# Patient Record
Sex: MALE | Race: BLACK OR AFRICAN AMERICAN | Employment: UNEMPLOYED | URBAN - METROPOLITAN AREA
[De-identification: names, ages, dates, MRNs, and addresses within clinical notes are randomized per-mention and may not be internally consistent; named-entity substitution may affect disease eponyms.]

---

## 2018-06-16 ENCOUNTER — HOSPITAL ENCOUNTER (EMERGENCY)
Age: 28
Discharge: HOME | End: 2018-06-16
Attending: EMERGENCY MEDICINE
Payer: MEDICAID

## 2018-06-16 ENCOUNTER — APPOINTMENT (OUTPATIENT)
Dept: GENERAL RADIOLOGY | Age: 28
End: 2018-06-16
Attending: PHYSICIAN ASSISTANT
Payer: MEDICAID

## 2018-06-16 VITALS
RESPIRATION RATE: 14 BRPM | OXYGEN SATURATION: 97 % | DIASTOLIC BLOOD PRESSURE: 83 MMHG | TEMPERATURE: 98.5 F | BODY MASS INDEX: 27.06 KG/M2 | SYSTOLIC BLOOD PRESSURE: 133 MMHG | HEART RATE: 73 BPM | WEIGHT: 194 LBS

## 2018-06-16 DIAGNOSIS — S16.1XXA STRAIN OF NECK MUSCLE, INITIAL ENCOUNTER: ICD-10-CM
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
## 2018-06-16 PROCEDURE — 99282 EMERGENCY DEPT VISIT SF MDM: CPT

## 2018-06-16 PROCEDURE — 72050 X-RAY EXAM NECK SPINE 4/5VWS: CPT

## 2018-06-16 RX ORDER — OXYCODONE AND ACETAMINOPHEN 5; 325 MG/1; MG/1
1 TABLET ORAL
Status: DISCONTINUED | OUTPATIENT
Start: 2018-06-16 | End: 2018-06-16 | Stop reason: CLARIF
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
## 2018-07-14 ENCOUNTER — HOSPITAL ENCOUNTER (EMERGENCY)
Age: 28
Discharge: HOME | End: 2018-07-14
Attending: EMERGENCY MEDICINE
Payer: SELF-PAY

## 2018-07-14 VITALS
DIASTOLIC BLOOD PRESSURE: 86 MMHG | RESPIRATION RATE: 18 BRPM | SYSTOLIC BLOOD PRESSURE: 136 MMHG | HEART RATE: 92 BPM | TEMPERATURE: 98.2 F | OXYGEN SATURATION: 94 %

## 2018-07-14 DIAGNOSIS — R46.89 AGGRESSIVE BEHAVIOR: Primary | ICD-10-CM

## 2018-07-14 LAB
AMPHET UR QL SCN: NEGATIVE
ANION GAP SERPL CALC-SCNC: 7 MMOL/L (ref 3–18)
BARBITURATES UR QL SCN: NEGATIVE
BASOPHILS # BLD: 0 K/UL (ref 0–0.1)
BASOPHILS NFR BLD: 0 % (ref 0–2)
BENZODIAZ UR QL: NEGATIVE
BUN SERPL-MCNC: 11 MG/DL (ref 7–18)
BUN/CREAT SERPL: 10 (ref 12–20)
CALCIUM SERPL-MCNC: 8.6 MG/DL (ref 8.5–10.1)
CANNABINOIDS UR QL SCN: POSITIVE
CHLORIDE SERPL-SCNC: 106 MMOL/L (ref 100–108)
CO2 SERPL-SCNC: 28 MMOL/L (ref 21–32)
COCAINE UR QL SCN: NEGATIVE
CREAT SERPL-MCNC: 1.14 MG/DL (ref 0.6–1.3)
DIFFERENTIAL METHOD BLD: ABNORMAL
EOSINOPHIL # BLD: 0.1 K/UL (ref 0–0.4)
EOSINOPHIL NFR BLD: 1 % (ref 0–5)
ERYTHROCYTE [DISTWIDTH] IN BLOOD BY AUTOMATED COUNT: 13.2 % (ref 11.6–14.5)
ETHANOL SERPL-MCNC: <3 MG/DL (ref 0–3)
GLUCOSE SERPL-MCNC: 93 MG/DL (ref 74–99)
HCT VFR BLD AUTO: 41.3 % (ref 36–48)
HDSCOM,HDSCOM: ABNORMAL
HGB BLD-MCNC: 14.3 G/DL (ref 13–16)
LYMPHOCYTES # BLD: 2.8 K/UL (ref 0.9–3.6)
LYMPHOCYTES NFR BLD: 30 % (ref 21–52)
MCH RBC QN AUTO: 28.1 PG (ref 24–34)
MCHC RBC AUTO-ENTMCNC: 34.6 G/DL (ref 31–37)
MCV RBC AUTO: 81.3 FL (ref 74–97)
METHADONE UR QL: NEGATIVE
MONOCYTES # BLD: 1.2 K/UL (ref 0.05–1.2)
MONOCYTES NFR BLD: 12 % (ref 3–10)
NEUTS SEG # BLD: 5.4 K/UL (ref 1.8–8)
NEUTS SEG NFR BLD: 57 % (ref 40–73)
OPIATES UR QL: NEGATIVE
PCP UR QL: NEGATIVE
PLATELET # BLD AUTO: 159 K/UL (ref 135–420)
PMV BLD AUTO: 11.5 FL (ref 9.2–11.8)
POTASSIUM SERPL-SCNC: 3.9 MMOL/L (ref 3.5–5.5)
RBC # BLD AUTO: 5.08 M/UL (ref 4.7–5.5)
SODIUM SERPL-SCNC: 141 MMOL/L (ref 136–145)
WBC # BLD AUTO: 9.5 K/UL (ref 4.6–13.2)

## 2018-07-14 PROCEDURE — 80307 DRUG TEST PRSMV CHEM ANLYZR: CPT | Performed by: EMERGENCY MEDICINE

## 2018-07-14 PROCEDURE — 99283 EMERGENCY DEPT VISIT LOW MDM: CPT

## 2018-07-14 PROCEDURE — 85025 COMPLETE CBC W/AUTO DIFF WBC: CPT | Performed by: EMERGENCY MEDICINE

## 2018-07-14 PROCEDURE — 80048 BASIC METABOLIC PNL TOTAL CA: CPT | Performed by: EMERGENCY MEDICINE
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
## 2019-04-21 PROCEDURE — 99282 EMERGENCY DEPT VISIT SF MDM: CPT

## 2019-04-22 ENCOUNTER — HOSPITAL ENCOUNTER (EMERGENCY)
Age: 29
Discharge: HOME | End: 2019-04-22
Attending: EMERGENCY MEDICINE
Payer: MEDICAID

## 2019-04-22 VITALS
OXYGEN SATURATION: 99 % | RESPIRATION RATE: 16 BRPM | SYSTOLIC BLOOD PRESSURE: 112 MMHG | DIASTOLIC BLOOD PRESSURE: 82 MMHG | HEART RATE: 70 BPM | TEMPERATURE: 97 F

## 2019-04-22 DIAGNOSIS — R44.3 HALLUCINATION: ICD-10-CM

## 2019-04-22 DIAGNOSIS — F41.9 ANXIETY: Primary | ICD-10-CM

## 2019-04-22 NOTE — ED TRIAGE NOTES
When I informed pt about the lack of beds he states he wants to be psych. States he is hearing voices that are telling him he is worthless but will not act on them. Getting pt some water so he can provide a urine sample.

## 2019-04-22 NOTE — ED PROVIDER NOTES
EMERGENCY DEPARTMENT HISTORY AND PHYSICAL EXAM 
 
5:20 AM 
 
 
Date: 4/22/2019 Patient Name: Umer Cardenas History of Presenting Illness Chief Complaint Patient presents with  Anxiety  Mental Health Problem History Provided By: Patient Additional History (Context): Umer Cardenas is a 33 y.o. male with hx of schizophrenia, aggressive outburst, monica, and substance abuse who presents with acute anxiety attack over the last 8 hours. He reports he has had similar attacks in the past. Pt states that he is hearing voices, but he states he normally hears voices. Pt denies SI or HI. He states he sees a counselor and is taking his medications. Thinks he may be on too many. No other concerns or symptoms at this time. PCP: Kb Noel MD 
 
Chief Complaint: Anxiety Duration: 8 Hours Timing:  Acute Location: N/A Quality: N/A Severity: N/A Modifying Factors: None Associated Symptoms: hallucinations Current Outpatient Medications Medication Sig Dispense Refill  divalproex sodium (DEPAKOTE PO) Take  by mouth.  haloperidol lactate (HALDOL INJECTION) by Injection route.  cholecalciferol (VITAMIN D3) 1,000 unit tablet Take 1,000 Units by mouth daily. Past History Past Medical History: 
Past Medical History:  
Diagnosis Date  Aggressive outburst   
 Monica ([facility]  Schizophrenia ([facility] [facility]  Substance abuse ([facility] [address] Past Surgical History: 
History reviewed. No pertinent surgical history. Family History: 
Family History Problem Relation Age of Onset  Bipolar Disorder Maternal Aunt Social History: 
Social History Tobacco Use  Smoking status: Current Every Day Smoker Packs/day: 1.00 Types: Cigarettes Substance Use Topics  Alcohol use: No  
  Comment: none currently  Drug use: No  
  Comment: noone currently Allergies: 
No Known Allergies Review of Systems Review of Systems Constitutional: Negative for activity change, fatigue and fever. HENT: Negative for congestion and rhinorrhea. Eyes: Negative for visual disturbance. Respiratory: Negative for shortness of breath. Cardiovascular: Negative for chest pain and palpitations. Gastrointestinal: Negative for abdominal pain, diarrhea, nausea and vomiting. Genitourinary: Negative for dysuria and hematuria. Musculoskeletal: Negative for back pain. Skin: Negative for rash. Neurological: Negative for dizziness, weakness and light-headedness. Psychiatric/Behavioral: Positive for hallucinations. Negative for self-injury and suicidal ideas. The patient is nervous/anxious. Negative for homicidal ideas. All other systems reviewed and are negative. Physical Exam  
 
Visit Vitals /82 (BP 1 Location: Left arm, BP Patient Position: At rest;Sitting) Pulse 70 Temp 97 °F (36.1 °C) Resp 16 SpO2 99% Physical Exam  
Constitutional: He is oriented to person, place, and time. He appears well-developed and well-nourished. No distress. Disheveled HENT:  
Head: Normocephalic and atraumatic. Right Ear: External ear normal.  
Left Ear: External ear normal.  
Nose: Nose normal.  
Mouth/Throat: Oropharynx is clear and moist.  
Eyes: Pupils are equal, round, and reactive to light. Conjunctivae and EOM are normal.  
Neck: Normal range of motion. Neck supple. No tracheal deviation present. Cardiovascular: Normal rate, regular rhythm and intact distal pulses. Pulmonary/Chest: Effort normal and breath sounds normal. He exhibits no tenderness. Abdominal: Soft. Bowel sounds are normal. He exhibits no distension. There is no tenderness. There is no rebound and no guarding. Musculoskeletal: Normal range of motion. He exhibits no edema or tenderness. Neurological: He is alert and oriented to person, place, and time. No cranial nerve deficit. Coordination normal.  
Skin: Skin is warm and dry. Psychiatric: His behavior is normal.  
Nursing note and vitals reviewed. Diagnostic Study Results Labs - No results found for this or any previous visit (from the past 12 hour(s)). Radiologic Studies - No orders to display Medical Decision Making It should be noted that Daquan LERNER DO will be the provider of record for this patient. I reviewed the vital signs, available nursing notes, past medical history, past surgical history, family history and social history. Vital Signs-Reviewed the patient's vital signs. Pulse Oximetry Analysis -  99% on room air , nml 
 
Cardiac Monitor: 
Rate: 70 BPM 
 
 
Records Reviewed: Nursing Notes and Old Medical Records (Time of Review: 5:20 AM) ED Course: Progress Notes, Reevaluation, and Consults: 
Patient with no SI or HI Provider Notes (Medical Decision Making):  
Patient is a 65-year-old male who states that he was anxious at his place of living. Patient has hallucinations but this is not new for him. He is not suicidal or homicidal.  Voices are not telling him to hurt himself or anyone else. Patient is hungry and would like food. States that he has a counselor that he sees a [place] that he can follow-up with today. He is concerned that he may be on too many of his medications. He is to discuss this with his counselor as an outpatient appointment. Understands and agrees with plan. He is not a danger to himself and others at this time and has chronic hallucinations. Stable for discharge. Diagnosis Clinical Impression: 1. Anxiety 2. Hallucination Disposition: Discharged Follow-up Information Follow up With Specialties Details Why Contact Info Victor Manuel Francis MD Internal Medicine  Please call your counselor today for an appointment [address] [place] [address] [facility] [address] [phone] Patient's Medications Start Taking No medications on file Continue Taking CHOLECALCIFEROL (VITAMIN D3) 1,000 UNIT TABLET    Take 1,000 Units by mouth daily. DIVALPROEX SODIUM (DEPAKOTE PO)    Take  by mouth. HALOPERIDOL LACTATE (HALDOL INJECTION)    by Injection route. These Medications have changed No medications on file Stop Taking No medications on file  
 
_______________________________ Scribe Attestation Anastacia Abreu acting as a scribe for and in the presence of [address] [place], [place] [address]e [facility], DO April 22, 2019 at 5:52 AM 
    
Provider Attestation:     
I personally performed the services described in the documentation, reviewed the documentation, as recorded by the scribe in my presence, and it accurately and completely records my words and actions. April 22, 2019 at 5:52 AM - Lyndal Runner A, DO  
 
 
_______________________________

## 2019-04-22 NOTE — ED TRIAGE NOTES
Pt c/o anxiety since 2030 tonight. Pt dx with Bipolar and anxiety, states his meds are not working right now. Pt eyes are blood shot and are bouncing when he looks to the left. Pt falling asleep in triage, had to be woken up to be called into triage on second attempt.

## 2019-05-08 ENCOUNTER — HOSPITAL ENCOUNTER (EMERGENCY)
Age: 29
Discharge: HOME | End: 2019-05-08
Attending: EMERGENCY MEDICINE
Payer: COMMERCIAL

## 2019-05-08 VITALS
HEART RATE: 79 BPM | HEIGHT: 72 IN | BODY MASS INDEX: 24.38 KG/M2 | RESPIRATION RATE: 16 BRPM | WEIGHT: 180 LBS | TEMPERATURE: 98.2 F | DIASTOLIC BLOOD PRESSURE: 73 MMHG | OXYGEN SATURATION: 97 % | SYSTOLIC BLOOD PRESSURE: 116 MMHG

## 2019-05-08 DIAGNOSIS — F20.9 SCHIZOPHRENIA, UNSPECIFIED TYPE (HCC): Primary | ICD-10-CM

## 2019-05-08 LAB
ALBUMIN SERPL-MCNC: 3.5 G/DL (ref 3.4–5)
ALBUMIN/GLOB SERPL: 1.2 {RATIO} (ref 0.8–1.7)
ALP SERPL-CCNC: 54 U/L (ref 45–117)
ALT SERPL-CCNC: 15 U/L (ref 16–61)
ANION GAP SERPL CALC-SCNC: 7 MMOL/L (ref 3–18)
AST SERPL-CCNC: 13 U/L (ref 15–37)
BASOPHILS # BLD: 0 K/UL (ref 0–0.1)
BASOPHILS NFR BLD: 0 % (ref 0–2)
BILIRUB SERPL-MCNC: 0.3 MG/DL (ref 0.2–1)
BUN SERPL-MCNC: 7 MG/DL (ref 7–18)
BUN/CREAT SERPL: 6 (ref 12–20)
CALCIUM SERPL-MCNC: 8.6 MG/DL (ref 8.5–10.1)
CHLORIDE SERPL-SCNC: 105 MMOL/L (ref 100–108)
CO2 SERPL-SCNC: 27 MMOL/L (ref 21–32)
CREAT SERPL-MCNC: 1.14 MG/DL (ref 0.6–1.3)
DIFFERENTIAL METHOD BLD: NORMAL
EOSINOPHIL # BLD: 0.1 K/UL (ref 0–0.4)
EOSINOPHIL NFR BLD: 2 % (ref 0–5)
ERYTHROCYTE [DISTWIDTH] IN BLOOD BY AUTOMATED COUNT: 13.8 % (ref 11.6–14.5)
ETHANOL SERPL-MCNC: <3 MG/DL (ref 0–3)
GLOBULIN SER CALC-MCNC: 3 G/DL (ref 2–4)
GLUCOSE SERPL-MCNC: 75 MG/DL (ref 74–99)
HCT VFR BLD AUTO: 39.9 % (ref 36–48)
HGB BLD-MCNC: 14.1 G/DL (ref 13–16)
LYMPHOCYTES # BLD: 3.6 K/UL (ref 0.9–3.6)
LYMPHOCYTES NFR BLD: 46 % (ref 21–52)
MCH RBC QN AUTO: 29.7 PG (ref 24–34)
MCHC RBC AUTO-ENTMCNC: 35.3 G/DL (ref 31–37)
MCV RBC AUTO: 84.2 FL (ref 74–97)
MONOCYTES # BLD: 0.7 K/UL (ref 0.05–1.2)
MONOCYTES NFR BLD: 8 % (ref 3–10)
NEUTS SEG # BLD: 3.4 K/UL (ref 1.8–8)
NEUTS SEG NFR BLD: 44 % (ref 40–73)
PLATELET # BLD AUTO: 167 K/UL (ref 135–420)
PMV BLD AUTO: 11.6 FL (ref 9.2–11.8)
POTASSIUM SERPL-SCNC: 3.7 MMOL/L (ref 3.5–5.5)
PROT SERPL-MCNC: 6.5 G/DL (ref 6.4–8.2)
RBC # BLD AUTO: 4.74 M/UL (ref 4.7–5.5)
SODIUM SERPL-SCNC: 139 MMOL/L (ref 136–145)
WBC # BLD AUTO: 7.8 K/UL (ref 4.6–13.2)

## 2019-05-08 PROCEDURE — 80053 COMPREHEN METABOLIC PANEL: CPT

## 2019-05-08 PROCEDURE — 99283 EMERGENCY DEPT VISIT LOW MDM: CPT

## 2019-05-08 PROCEDURE — 85025 COMPLETE CBC W/AUTO DIFF WBC: CPT

## 2019-05-08 PROCEDURE — 80307 DRUG TEST PRSMV CHEM ANLYZR: CPT

## 2019-05-09 NOTE — DISCHARGE INSTRUCTIONS
Patient Education        Schizophrenia: Care Instructions  Your Care Instructions  Schizophrenia is a disease that makes it hard to think clearly, manage emotions, and interact with other people. It can cause:  · Delusions. These are beliefs that are not real.  · Hallucinations. These are things that you may see or hear that are not really there. · Paranoia. This is the belief that others are lying, cheating, using you, or trying to harm you. The disease may change your ability to enjoy life, express emotions, or function. At times, you may hear voices, behave strangely, have trouble speaking or understanding speech, or keep to yourself. The goal of treatment is to lower your stress and help your brain function normally. You may need lifelong treatment with medicines and counseling to keep your schizophrenia under control. When schizophrenia is not treated, the risks are higher for suicide, a hospital stay, and other problems. Early treatment called coordinated specialty care [facility]) may help a person who is having his or her first episode of psychotic thoughts. Ask your doctor about [address]. Follow-up care is a key part of your treatment and safety. Be sure to make and go to all appointments, and call your doctor if you are having problems. It's also a good idea to know your test results and keep a list of the medicines you take. How can you care for yourself at home? · Be safe with medicines. Take your medicines exactly as prescribed. Call your doctor if you think you are having a problem with your medicine. When you feel good, you may think that you do not need your medicines. But it is important to keep taking them as scheduled. · Go to your counseling sessions. Call and talk with your counselor if you can't attend or if you don't think the sessions are helping. Do not just stop going. · Eat a healthy diet. Talk with a dietitian about what type of diet may be best for you.   · Do not use alcohol or illegal drugs.  · Keep the numbers for these national suicide hotlines: 1-405-205-TALK (1-944.227.3585) and 0-438-GYYEVDL (2-628.621.5192). If you or someone you know talks about suicide or feeling hopeless, get help right away. What should you do if someone in your family has schizophrenia? · Learn about the disease and how it may get worse over time. · Remind your family member that you love him or her. · Make a plan with all family members about how to take care of your loved one when his or her symptoms are bad. · Talk about your fears and concerns and those of other family members. Seek counseling if needed. · Do not focus attention only on the person who is in treatment. · Remind yourself that it will take time for changes to occur. · Do not blame yourself for the disease. · Know your legal rights and the legal rights of your family member. · Take care of yourself. Stay involved with your own interests, such as your career, hobbies, and friends. Use exercise, positive self-talk, relaxation, and deep breathing to help lower your stress. · Give yourself time to grieve. You may need to deal with emotions such as anger, fear, and frustration. After you work through your feelings, you will be better able to care for yourself and your family. · If you are having a hard time with your feelings and your interactions with your family member, talk with a counselor. When should you call for help? Call 911 anytime you think you may need emergency care.  For example, call if:    · You are thinking about suicide or are threatening suicide.     · You feel like hurting yourself or someone else.    Call your doctor now or seek immediate medical care if:    · You hear voices.     · You think someone is trying to harm you.     · You cannot concentrate or are easily confused.    Watch closely for changes in your health, and be sure to contact your doctor if:    · You are having trouble taking care of yourself.     · You cannot attend your counseling sessions. Where can you learn more? Go to http://tara-kayleen.info/. Enter R727 in the search box to learn more about \"Schizophrenia: Care Instructions. \"  Current as of: September 11, 2018  Content Version: 11.9  © 7152-0870 [employer]. Care instructions adapted under license by [employer] (which disclaims liability or warranty for this information). If you have questions about a medical condition or this instruction, always ask your healthcare professional. [address] any warranty or liability for your use of this information.
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
## 2019-05-09 NOTE — ED NOTES
I have reviewed discharge instructions with the patient. The patient verbalized understanding. Patient ambulated out of ER without distress. Steady gait noted. Vitals signs stable upon discharge. No new complaints noted at this time.

## 2019-05-09 NOTE — ED TRIAGE NOTES
Patient brought in by EMS. He stated that he is having hallucinations and feels their are ticks crawling on him. Patient offered water to give urine sample.

## 2019-06-16 ENCOUNTER — HOSPITAL ENCOUNTER (EMERGENCY)
Age: 29
Discharge: LEFT BEFORE DISCHARGE | End: 2019-06-17
Attending: EMERGENCY MEDICINE
Payer: COMMERCIAL

## 2019-06-16 VITALS
TEMPERATURE: 98.7 F | HEIGHT: 72 IN | BODY MASS INDEX: 24.38 KG/M2 | SYSTOLIC BLOOD PRESSURE: 134 MMHG | OXYGEN SATURATION: 99 % | RESPIRATION RATE: 16 BRPM | WEIGHT: 180 LBS | HEART RATE: 72 BPM | DIASTOLIC BLOOD PRESSURE: 89 MMHG

## 2019-06-16 PROCEDURE — 75810000275 HC EMERGENCY DEPT VISIT NO LEVEL OF CARE

## 2020-01-03 ENCOUNTER — HOSPITAL ENCOUNTER (EMERGENCY)
Age: 30
Discharge: HOME | End: 2020-01-04
Attending: EMERGENCY MEDICINE
Payer: COMMERCIAL

## 2020-01-03 VITALS
DIASTOLIC BLOOD PRESSURE: 75 MMHG | HEART RATE: 86 BPM | BODY MASS INDEX: 25.26 KG/M2 | OXYGEN SATURATION: 99 % | WEIGHT: 186.5 LBS | HEIGHT: 72 IN | SYSTOLIC BLOOD PRESSURE: 121 MMHG | TEMPERATURE: 98.5 F | RESPIRATION RATE: 16 BRPM

## 2020-01-03 DIAGNOSIS — F25.9 SCHIZOAFFECTIVE DISORDER, UNSPECIFIED TYPE (HCC): Primary | ICD-10-CM

## 2020-01-03 PROCEDURE — 99282 EMERGENCY DEPT VISIT SF MDM: CPT

## 2020-01-04 NOTE — DISCHARGE INSTRUCTIONS
Patient Education        Schizophrenia: Care Instructions  Your Care Instructions  Schizophrenia is a disease that makes it hard to think clearly, manage emotions, and interact with other people. It can cause:  · Delusions. These are beliefs that are not real.  · Hallucinations. These are things that you may see or hear that are not really there. · Paranoia. This is the belief that others are lying, cheating, using you, or trying to harm you. The disease may change your ability to enjoy life, express emotions, or function. At times, you may hear voices, behave strangely, have trouble speaking or understanding speech, or keep to yourself. The goal of treatment is to lower your stress and help your brain function normally. You may need lifelong treatment with medicines and counseling to keep your schizophrenia under control. When schizophrenia is not treated, the risks are higher for suicide, a hospital stay, and other problems. Early treatment called coordinated specialty care [facility]) may help a person who is having his or her first episode of psychotic thoughts. Ask your doctor about [facility]. Follow-up care is a key part of your treatment and safety. Be sure to make and go to all appointments, and call your doctor if you are having problems. It's also a good idea to know your test results and keep a list of the medicines you take. How can you care for yourself at home? · Be safe with medicines. Take your medicines exactly as prescribed. Call your doctor if you think you are having a problem with your medicine. When you feel good, you may think that you do not need your medicines. But it is important to keep taking them as scheduled. · Go to your counseling sessions. Call and talk with your counselor if you can't attend or if you don't think the sessions are helping. Do not just stop going. · Eat a healthy diet. Talk with a dietitian about what type of diet may be best for you.   · Do not use alcohol or illegal drugs.  · Keep the numbers for these national suicide hotlines: 7-153-902-TALK (8-790.268.1379) and 9-486-OTQXKDH (9-803.261.4355). If you or someone you know talks about suicide or feeling hopeless, get help right away. What should you do if someone in your family has schizophrenia? · Learn about the disease and how it may get worse over time. · Remind your family member that you love him or her. · Make a plan with all family members about how to take care of your loved one when his or her symptoms are bad. · Talk about your fears and concerns and those of other family members. Seek counseling if needed. · Do not focus attention only on the person who is in treatment. · Remind yourself that it will take time for changes to occur. · Do not blame yourself for the disease. · Know your legal rights and the legal rights of your family member. · Take care of yourself. Stay involved with your own interests, such as your career, hobbies, and friends. Use exercise, positive self-talk, relaxation, and deep breathing to help lower your stress. · Give yourself time to grieve. You may need to deal with emotions such as anger, fear, and frustration. After you work through your feelings, you will be better able to care for yourself and your family. · If you are having a hard time with your feelings and your interactions with your family member, talk with a counselor. When should you call for help? Call 911 anytime you think you may need emergency care.  For example, call if:    · You are thinking about suicide or are threatening suicide.     · You feel like hurting yourself or someone else.    Call your doctor now or seek immediate medical care if:    · You hear voices.     · You think someone is trying to harm you.     · You cannot concentrate or are easily confused.    Watch closely for changes in your health, and be sure to contact your doctor if:    · You are having trouble taking care of yourself.     · You cannot attend your counseling sessions. Where can you learn more? Go to http://tara-kayleen.info/. Enter M593 in the search box to learn more about \"Schizophrenia: Care Instructions. \"  Current as of: May 28, 2019  Content Version: 12.2  © 6741-3487 [employer]. Care instructions adapted under license by [employer] (which disclaims liability or warranty for this information). If you have questions about a medical condition or this instruction, always ask your healthcare professional. [address] any warranty or liability for your use of this information.
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
## 2020-01-25 ENCOUNTER — HOSPITAL ENCOUNTER (INPATIENT)
Age: 30
LOS: 3 days | Discharge: HOME | DRG: 750 | End: 2020-01-29
Attending: EMERGENCY MEDICINE | Admitting: PSYCHIATRY & NEUROLOGY
Payer: COMMERCIAL

## 2020-01-25 DIAGNOSIS — R44.0 AUDITORY HALLUCINATIONS: Primary | ICD-10-CM

## 2020-01-25 LAB
AMPHET UR QL SCN: NEGATIVE
BARBITURATES UR QL SCN: NEGATIVE
BENZODIAZ UR QL: NEGATIVE
CANNABINOIDS UR QL SCN: NEGATIVE
COCAINE UR QL SCN: NEGATIVE
HDSCOM,HDSCOM: NORMAL
METHADONE UR QL: NEGATIVE
OPIATES UR QL: NEGATIVE
PCP UR QL: NEGATIVE

## 2020-01-25 PROCEDURE — 80307 DRUG TEST PRSMV CHEM ANLYZR: CPT

## 2020-01-25 PROCEDURE — 99282 EMERGENCY DEPT VISIT SF MDM: CPT

## 2020-01-26 PROBLEM — F20.9 SCHIZOPHRENIA (HCC): Status: ACTIVE | Noted: 2020-01-26

## 2020-01-26 LAB
ANION GAP SERPL CALC-SCNC: 4 MMOL/L (ref 3–18)
BASOPHILS # BLD: 0 K/UL (ref 0–0.1)
BASOPHILS NFR BLD: 0 % (ref 0–2)
BUN SERPL-MCNC: 15 MG/DL (ref 7–18)
BUN/CREAT SERPL: 12 (ref 12–20)
CALCIUM SERPL-MCNC: 8.5 MG/DL (ref 8.5–10.1)
CHLORIDE SERPL-SCNC: 105 MMOL/L (ref 100–111)
CO2 SERPL-SCNC: 30 MMOL/L (ref 21–32)
CREAT SERPL-MCNC: 1.24 MG/DL (ref 0.6–1.3)
DIFFERENTIAL METHOD BLD: ABNORMAL
EOSINOPHIL # BLD: 0.2 K/UL (ref 0–0.4)
EOSINOPHIL NFR BLD: 2 % (ref 0–5)
ERYTHROCYTE [DISTWIDTH] IN BLOOD BY AUTOMATED COUNT: 13.8 % (ref 11.6–14.5)
ETHANOL SERPL-MCNC: <3 MG/DL (ref 0–3)
GLUCOSE SERPL-MCNC: 84 MG/DL (ref 74–99)
HCT VFR BLD AUTO: 41.8 % (ref 36–48)
HGB BLD-MCNC: 14.1 G/DL (ref 13–16)
LYMPHOCYTES # BLD: 3.2 K/UL (ref 0.9–3.6)
LYMPHOCYTES NFR BLD: 43 % (ref 21–52)
MCH RBC QN AUTO: 29.1 PG (ref 24–34)
MCHC RBC AUTO-ENTMCNC: 33.7 G/DL (ref 31–37)
MCV RBC AUTO: 86.2 FL (ref 74–97)
MONOCYTES # BLD: 1.1 K/UL (ref 0.05–1.2)
MONOCYTES NFR BLD: 14 % (ref 3–10)
NEUTS SEG # BLD: 3.2 K/UL (ref 1.8–8)
NEUTS SEG NFR BLD: 41 % (ref 40–73)
PLATELET # BLD AUTO: 161 K/UL (ref 135–420)
PMV BLD AUTO: 12.8 FL (ref 9.2–11.8)
POTASSIUM SERPL-SCNC: 4.3 MMOL/L (ref 3.5–5.5)
RBC # BLD AUTO: 4.85 M/UL (ref 4.7–5.5)
SODIUM SERPL-SCNC: 139 MMOL/L (ref 136–145)
WBC # BLD AUTO: 7.7 K/UL (ref 4.6–13.2)

## 2020-01-26 PROCEDURE — 65220000003 HC RM SEMIPRIVATE PSYCH

## 2020-01-26 PROCEDURE — 80307 DRUG TEST PRSMV CHEM ANLYZR: CPT

## 2020-01-26 PROCEDURE — 80048 BASIC METABOLIC PNL TOTAL CA: CPT

## 2020-01-26 PROCEDURE — 85025 COMPLETE CBC W/AUTO DIFF WBC: CPT

## 2020-01-26 RX ORDER — TRAZODONE HYDROCHLORIDE 50 MG/1
50 TABLET ORAL
Status: DISCONTINUED | OUTPATIENT
Start: 2020-01-26 | End: 2020-01-29 | Stop reason: HOSPADM

## 2020-01-26 RX ORDER — LORAZEPAM 1 MG/1
1-2 TABLET ORAL
Status: DISCONTINUED | OUTPATIENT
Start: 2020-01-26 | End: 2020-01-29 | Stop reason: HOSPADM

## 2020-01-26 RX ORDER — HALOPERIDOL 5 MG/1
5 TABLET ORAL
Status: DISCONTINUED | OUTPATIENT
Start: 2020-01-26 | End: 2020-01-29 | Stop reason: HOSPADM

## 2020-01-26 RX ORDER — DIVALPROEX SODIUM 250 MG/1
500 TABLET, DELAYED RELEASE ORAL 2 TIMES DAILY
Status: DISCONTINUED | OUTPATIENT
Start: 2020-01-27 | End: 2020-01-29 | Stop reason: HOSPADM

## 2020-01-26 RX ORDER — HALOPERIDOL 5 MG/ML
5 INJECTION INTRAMUSCULAR
Status: DISCONTINUED | OUTPATIENT
Start: 2020-01-26 | End: 2020-01-29 | Stop reason: HOSPADM

## 2020-01-26 RX ORDER — BENZTROPINE MESYLATE 1 MG/1
1 TABLET ORAL
Status: DISCONTINUED | OUTPATIENT
Start: 2020-01-26 | End: 2020-01-29 | Stop reason: HOSPADM

## 2020-01-26 RX ORDER — BENZTROPINE MESYLATE 1 MG/ML
1 INJECTION INTRAMUSCULAR; INTRAVENOUS
Status: DISCONTINUED | OUTPATIENT
Start: 2020-01-26 | End: 2020-01-29 | Stop reason: HOSPADM

## 2020-01-26 RX ORDER — LORAZEPAM 2 MG/ML
1 INJECTION INTRAMUSCULAR
Status: DISCONTINUED | OUTPATIENT
Start: 2020-01-26 | End: 2020-01-29 | Stop reason: HOSPADM

## 2020-01-26 NOTE — ED TRIAGE NOTES
Pt reports that he is hearing voices that he states are telling him to harm himself but pt reports \" I don't listen\"
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
## 2020-01-27 PROBLEM — F20.9 SCHIZOPHRENIA (HCC): Status: RESOLVED | Noted: 2020-01-26 | Resolved: 2020-01-27

## 2020-01-27 PROCEDURE — 74011250637 HC RX REV CODE- 250/637: Performed by: PSYCHIATRY & NEUROLOGY

## 2020-01-27 PROCEDURE — 65220000005 HC RM SEMIPRIVATE PSYCH 3 OR 4 BED

## 2020-01-27 RX ADMIN — DIVALPROEX SODIUM 500 MG: 250 TABLET, DELAYED RELEASE ORAL at 07:46

## 2020-01-27 RX ADMIN — TRAZODONE HYDROCHLORIDE 50 MG: 50 TABLET ORAL at 21:25

## 2020-01-27 RX ADMIN — DIVALPROEX SODIUM 500 MG: 250 TABLET, DELAYED RELEASE ORAL at 21:25

## 2020-01-27 RX ADMIN — DIVALPROEX SODIUM 500 MG: 250 TABLET, DELAYED RELEASE ORAL at 00:05

## 2020-01-27 RX ADMIN — OLANZAPINE 15 MG: 10 TABLET, FILM COATED ORAL at 17:08
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
## 2020-01-27 NOTE — BH NOTES
Patient awake and pacing in room. Talking out loud to himself; waking up his room mate. Laughing to himself at times also. Patient standing in front of bathroom closed door while room mate used the restroom. Patients room mate told writer that  he was not comfortable with Tamms Crutch standing in front of the door. Leonid Crutch continued to talk to himself and keep the roommate awake. Supervisor notified. Patient agreed to move to a room without a roommate, room 110-1. Patient was oriented to time, place and person, yet continues to talk to himself. Patient declined prn medication when offered several times. Has been awake. Patient states he is allergic to lithium and has concerns about ativan and haldol also.

## 2020-01-27 NOTE — GROUP NOTE
JEANNIE  GROUP DOCUMENTATION INDIVIDUAL Group Therapy Note Date: 1/27/2020 Group Start Time: 46 Group End Time: 3085 Group Topic: Nursing [facility] 1 SPECIAL TRTMT 2 Lorena Lai, KENY 
 
[facility] GROUP DOCUMENTATION GROUP Group Therapy Note Attendees: 5 Attendance: Attended Interventions/techniques: Informed and Supported Follows Directions: Followed directions Interactions: Interacted appropriately Mental Status: Preoccupied Behavior/appearance: Cooperative and Neatly groomed Goals Achieved: Able to listen to others, Discussed safety plan and Increased hopefulness [place]al
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
## 2020-01-27 NOTE — PROGRESS NOTES
Problem: Altered Thought Process (Adult/Pediatric)  Goal: *STG: Remains safe in hospital  Description  Patient will remain safe and not harm himself or others while in hospital.   Outcome: Progressing Towards Goal     Problem: Altered Thought Process (Adult/Pediatric)  Goal: *STG: Complies with medication therapy  Description  Patient will comply with medication regimen as ordered by the doctor during hospital stay. Outcome: Progressing Towards Goal     Patient has been restless and talkative but very pleasant and interactive with staff and peers appropriately. Patient performed ADL's during day shift without prompting or assistance. Patient was compliant with staring Zyprexa this evening. Patient very pleased \"that ya'll listened to me and I'm on the right med now. \"  patient was cooperative with moving to another unit and was oriented by this nurse and MHT (both staff moved to new unit along with patient). Patient's mother called unit just before patient was moved and patient gave mother phone number to new unit as well as room number. Will continue to monitor and provide appropriate interventions as needed.

## 2020-01-27 NOTE — BSMART NOTE
SW Contact:  During interview pt shared content consistent with what is in record. At times despite cooperative was guarded & became more so, not wanting writer to contact outpt provider [employer] mom's speaking to them, don't you do it\". Pt was also venting some frustration with group [facility] but reminded him he needs to express his concerns to PACT team & he agreed. Encouraged pt to make more effort to attend groups & activities. /

## 2020-01-27 NOTE — GROUP NOTE
IP  GROUP DOCUMENTATION INDIVIDUAL Group Therapy Note Date: 1/26/2020 Group Start Time: 2015 Group End Time: 2030 Group Topic: Nursing [facility] ADULT CHEM DEP Chadd Powell., RN 
 
IP  GROUP DOCUMENTATION GROUP Group Therapy Note Attendees: 3 Attendance: Did not attend. Mere Sanchez.  Rm Carrillo

## 2020-01-27 NOTE — BSMART NOTE
ART THERAPY GROUP PROGRESS NOTE PATIENT SCHEDULED FOR GROUP AT: 10:30 
 
ATTENDANCE: 3/4 PARTICIPATION LEVEL: Needed extra prompting and re-direction and gaves up easily. ATTENTION LEVEL:Unable to attend to task at hand. FOCUS: Cognitive SYMBOLIC & THEMATIC CONTENT AS NOTED IN IMAGERY: Jaden's mood and affect appeared happy and congruent. Deacon Mcintosh began the directive but gave up at the slightest challenge. Deacon Mcintosh then chose to make his own work attempting to [employer]" however they were more scribble-like where he then observed the page and tried to make work from that. His approach to task was disorganized. If he didn't like the page he'd crumple it up and then garbage it. Deacon Mcintosh spoke about \"how he's always in trouble and can't avoid it,\" in reference to illegal graffiti. This note/group was written/conducted by Art Therapy Intern Yaw Pillai.

## 2020-01-27 NOTE — BH NOTES
This 26 yr old male admitted to [facility] via wheelchair on voluntary status accompanied by hospital security and ED Tech. Patient states he lives at a board and care home and was hearing voices to harm some of the staff for talking rude to him. He denies +SI/HI/VH on admission and is mainly focused on not taking ativan because it makes him \"sick\". Patient states, \"I am a gentle type of emily and I didn't start hearing voices until they gave me more than one of those pills. Cooperative with staff on admission and religiously preoccupied asking to have his Bible and some food to eat. Contracts for safety and states he feels \"safe\" here and does not want to harm anyone. Oriented to unit and to his assigned room. RN will initiate, develop, implement, review or revise treatment plan as needed.

## 2020-01-27 NOTE — ROUTINE PROCESS
TRANSFER - OUT REPORT: 
 
Verbal report given to Hu Vivas  (name) on Sheldon Baker  being transferred to behavioral health (unit) for routine progression of care Report consisted of patients Situation, Background, Assessment and  
Recommendations(SBAR). Information from the following report(s) SBAR and ED Summary was reviewed with the receiving nurse. Lines:    
 
Opportunity for questions and clarification was provided. Patient transported with: 
 [employer]

## 2020-01-28 PROCEDURE — 65220000005 HC RM SEMIPRIVATE PSYCH 3 OR 4 BED

## 2020-01-28 PROCEDURE — 74011250637 HC RX REV CODE- 250/637: Performed by: PSYCHIATRY & NEUROLOGY

## 2020-01-28 RX ADMIN — OLANZAPINE 15 MG: 10 TABLET, FILM COATED ORAL at 17:05

## 2020-01-28 NOTE — BSMART NOTE
ART THERAPY GROUP PROGRESS NOTE PATIENT SCHEDULED FOR GROUP AT: 9284 ATTENDANCE: Full PARTICIPATION LEVEL: Does not engage in the art process or gives up easily. ATTENTION LEVEL:  Unable to attend to task at hand. FOCUS: Grounding SYMBOLIC & THEMATIC CONTENT AS NOTED IN IMAGERY: He presented with a calm mood and a blunted affect. He was not invested in the task at hand and was guarded. He declined to work on the task directive despite encouragement and claimed he would \"just observe. \" He sat to himself the entire group and left during group discussion without warning.

## 2020-01-28 NOTE — BSMART NOTE
COUNSELING GROUP PROGRESS NOTE Group time: 9:30 The patient did not awaken/get up when called for group despite encouragement.

## 2020-01-28 NOTE — PROGRESS NOTES
[address]  Inpatient Progress Note     Date of Service: 01/28/20  Hospital Day: 2     Subjective/Interval History   01/28/20    Treatment Team Notes:  Notes reviewed and/or discussed and report that Dipak Prieto is a 39-year-old male with history of schizoaffective disorder admitted for auditory hallucinations. No behavioral issues overnight. Patient refused to take Depakote last night insisting he only wants to take Zyprexa. Patient interview: Dipak Prieto was interviewed by this writer today. Patient denies complaints. He states he is doing well. He says hallucinations have resolved. He denies suicidal or homicidal ideations. He feels he is back to baseline. Main issue is that he does not want to take Depakote. Review of records indicate that he has been maintained on combination of Depakote and Zyprexa in the past.  He also appears that he has a history of aggression which is probably why he has been prescribed Depakote. However he is adamant that he does not need Depakote and only wants to be maintained on Zyprexa. I will therefore discontinue Depakote for now since he is insisting on not taking it and he is compliant with the Zyprexa. Patient also reports he can return to [facility] assisted living facility. This information has to be confirmed to aid with discharge planning. Objective     Visit Vitals  /77   Pulse 76   Temp 96.5 °F (35.8 °C)   Resp 16   SpO2 99%       No results found for this or any previous visit (from the past 24 hour(s)). Mental Status Examination     Appearance/Hygiene 26 y.o.  BLACK OR  male  Hygiene: Fair   Behavior/Social Relatedness Appropriate   Musculoskeletal Gait/Station: appropriate  Tone (flaccid, cogwheeling, spastic): not assessed  Psychomotor (hyperkinetic, hypokinetic): calm   Involuntary movements (tics, dyskinesias, akathisa, stereotypies): none   Speech   Rate, rhythm, volume, fluency and articulation are appropriate   Mood   euthymic   Affect    congruent   Thought Process Linear and goal directed   Thought Content and Perceptual Disturbances Denies self-injurious behavior (SIB), suicidal ideation (SI), aggressive behavior or homicidal ideation (HI)    Denies auditory and visual hallucinations   Sensorium and Cognition  Grossly intact   Insight  Limited   Judgment  fair        Assessment/Plan      Psychiatric Diagnoses:   Patient Active Problem List   Diagnosis Code    Tobacco abuse Z72.0    Schizoaffective disorder ([facility] [address] F25.9    Cannabis abuse, continuous F12.10       Psychosocial and contextual factors: Current living situation    Level of impairment/disability:Moderate    Axel Ness is a 26 y.o. who is currently admitted for auditory hallucinations but is doing better. Patient currently not presenting with any symptoms of psychosis. 1.  Continue Zyprexa 15 mg at bedtime. Discontinue Depakote. 2.  Reviewed instructions, risks, benefits and side effects of medications  3. Disposition/Discharge Date: self-care/home, possible discharge tomorrow or Thursday.     Kristin Esparza MD [facility]  [facility]

## 2020-01-28 NOTE — GROUP NOTE
JEANNIE  GROUP DOCUMENTATION INDIVIDUAL Group Therapy Note Date: 1/27/2020 Group Start Time: 2030 Group End Time: 2045 Group Topic: Nursing [facility] [address] Group Therapy Note Attendees:11 Attendance: Attended Patient's Goal: Interventions/techniques: Informed Follows Directions: Followed directions Interactions: Interacted appropriately Mental Status: Calm Behavior/appearance: Attentive Goals Achieved: Able to listen to others Additional Notes:   
 
 
Irving Mcbride

## 2020-01-28 NOTE — BSMART NOTE
JOHNNIE assessment/Intervention: Berenice Abel is a 26-year-old male with history of schizoaffective disorder admitted for auditory hallucinations. Patient is observed isolated in his room. Patients appearance is slightly disheveled. Patient is compliant and oriented to person, place and situation. Patient reports he is prepared for discharge and is returning to [facility] where he resides. He reports some stressors however, reports his  is in the process of assisting him with alternative housing. He reports his finances are handled by the [address] [place] [address] and he is capable of caring for himself. Patient denies SI/HI. Patient denies AVH and reports the medication Zyprexa has assisted with decreasing and controlling the voices. SW addressed discharge plan and encouraged patient to follow up with care. Patient reports he has services with [facility] [address] and his  is (Danita Mayers [phone]). He reports he has med. management with Dr. Sharon Worthy. SW Collateral:   Services have been confirmed with  who will meet with patient upon discharge.  reports patient is able to return to his home however, they are in the process of transitioning to a new home. He reports he will assist patient with follow up care and will transport to scheduled appointments. SW will continue to assist patient with discharge and continued care.  
 
NIKIA Malin

## 2020-01-28 NOTE — BH NOTES
MHT Note: Patient interacts with staff and peers kindly but with guarded affect. Pt seen responding to AVHS , at times pt curses and responds aggressively to intenal stimuli. Pt reported \"I have no one to help me. I take care of myself\". Pt contracts for safety on unit and denies SI/HI at this time. Staff will continue to monitor pt for safety, behavior and location.

## 2020-01-28 NOTE — BSMART NOTE
OCCUPATIONAL THERAPY PROGRESS NOTE Group Time:  7947 Attendance: The patient attended 1/4 of group. Participation: The patient participated with minimal elaboration in the activity. Attention: The patient needed redirection to activity at least once. Interaction: The patient acknowledges others or responds to questions,  with no spontaneous interaction. Answered a few specific questions before leaving without explanation.

## 2020-01-28 NOTE — GROUP NOTE
JEANNIE  GROUP DOCUMENTATION INDIVIDUAL Group Therapy Note Date: 1/28/2020 Group Start Time: 0830 Group End Time: 0900 Group Topic: Nursing [facility] SPECIAL [facility] 1 KENY Villeda  GROUP DOCUMENTATION GROUP Group Therapy Note Attendees: 6 Attendance: Attended Patient's Goal:  What is anxiety? Interventions/techniques: Informed Follows Directions: Followed directions Interactions: Interacted appropriately Mental Status: Calm Behavior/appearance: Cooperative Goals Achieved: Able to engage in interactions and Able to listen to others Shannon Neri RN

## 2020-01-29 VITALS
OXYGEN SATURATION: 99 % | TEMPERATURE: 97.1 F | RESPIRATION RATE: 14 BRPM | SYSTOLIC BLOOD PRESSURE: 98 MMHG | HEART RATE: 100 BPM | DIASTOLIC BLOOD PRESSURE: 76 MMHG

## 2020-01-29 LAB
CHOLEST SERPL-MCNC: 145 MG/DL
EST. AVERAGE GLUCOSE BLD GHB EST-MCNC: 97 MG/DL
HBA1C MFR BLD: 5 % (ref 4.2–5.6)
HDLC SERPL-MCNC: 58 MG/DL (ref 40–60)
HDLC SERPL: 2.5 {RATIO} (ref 0–5)
LDLC SERPL CALC-MCNC: 67.4 MG/DL (ref 0–100)
LIPID PROFILE,FLP: NORMAL
TRIGL SERPL-MCNC: 98 MG/DL (ref ?–150)
VLDLC SERPL CALC-MCNC: 19.6 MG/DL

## 2020-01-29 PROCEDURE — 80061 LIPID PANEL: CPT

## 2020-01-29 PROCEDURE — 36415 COLL VENOUS BLD VENIPUNCTURE: CPT

## 2020-01-29 PROCEDURE — 83036 HEMOGLOBIN GLYCOSYLATED A1C: CPT

## 2020-01-29 RX ORDER — OLANZAPINE 15 MG/1
15 TABLET ORAL EVERY EVENING
Qty: 30 TAB | Refills: 0 | Status: SHIPPED | OUTPATIENT
Start: 2020-01-29

## 2020-01-29 RX ORDER — DIVALPROEX SODIUM 500 MG/1
500 TABLET, DELAYED RELEASE ORAL 2 TIMES DAILY
Qty: 60 TAB | Refills: 0 | Status: SHIPPED | OUTPATIENT
Start: 2020-01-29

## 2020-01-29 NOTE — GROUP NOTE
JEANNIE  GROUP DOCUMENTATION INDIVIDUAL Group Therapy Note Date: 1/28/2020 Group Start Time: 26 Group End Time: 1930 Group Topic: Nursing [facility] SPECIAL [facility] 1 KENY Hou  GROUP DOCUMENTATION GROUP Group Therapy Note Attendees: 6 Attendance: Did not attend Saige Alcantara RN

## 2020-01-29 NOTE — PROGRESS NOTES
conducted an Spirituality Group for Jairo Khan, who is a 26 y.o.,male. Patient's Primary Language is: [place]. According to the patient's EMR Congregation Affiliation is: Jaime Joseph. The reason the Patient came to the hospital is:   Patient Active Problem List    Diagnosis Date Noted    Cannabis abuse, continuous 12/23/2015     Priority: 1 - One    Schizoaffective disorder ([facility] [address] 05/30/2014    Tobacco abuse 05/27/2014         conducted Spirituality Group \"Worries and Pitney Aiyana the patient was one of the participants. The  provided the following Interventions:  Continued the relationship of care and support. Listened empathically. Offered prayer and assurance of continued prayer on patients behalf. Chart reviewed. The following outcomes were achieved:  Patient expressed gratitude for 's visit.  w  Assessment:  There are no further spiritual or Samaritan issues which require Spiritual Care Services interventions at this time. Plan:  Chaplains will continue to follow and will provide pastoral care on an as needed/requested basis.  recommends bedside caregivers page  on duty if patient shows signs of acute spiritual or emotional distress. Chaplain Eduarda RAIN  [address] [facility]   [phone]
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
## 2020-01-29 NOTE — BSMART NOTE
JOHNNIE assessment/Intervention: Nikko Padilla is a 79-year-old male with history of schizoaffective disorder admitted for auditory hallucinations. Patient is prepared for discharge. Patient reports he is returning to his home and will continue services with the PACT team.  SW informed patient of scheduled appointment and encouraged him to follow up for continuity of care. Patient denies SI/HI. Patient denies AVH. Patient is encouraged to contact  once he is home for follow up care. Discharge Complete. Ovidio Zafar LCSW-VAN

## 2020-01-29 NOTE — BH NOTES
Reviewed discharge paperwork with patient, removed armband, and answered all questions. Pt stable and in no distress. Accompanied downstairs for discharge.

## 2020-01-29 NOTE — DISCHARGE INSTRUCTIONS
BEHAVIORAL HEALTH NURSING DISCHARGE NOTE      The following personal items collected during your admission are returned to you:   Dental Appliance: Dental Appliances: None  Vision: Visual Aid: None  Hearing Aid:    Jewelry: Jewelry: Necklace  Clothing: Clothing: Pants, Shirt(sneakers, coat, blanket, scarves in storage room)  Other Valuables: Other Valuables: Cell Phone()  Valuables sent to safe:        PATIENT INSTRUCTIONS:    Patient armband removed and shredded    The discharge information has been reviewed with the patient. The patient verbalized understanding.     Phone numbers to remember:   [facility] : [facility]: [phone]  Suicide 21

## 2020-01-29 NOTE — BH NOTES
Treatment team met -     Medical Director:                                _x____present        Psychiatrist:                                        _x____present      Charge nurse:                                     _x____present           MSW:                                                _x____present      :                      _x____present      Nurse Manager:                                  _____present      Student RNs:                                      _____present      Medical Students:                               _x____present      Art Therapist:                                      _x____present   Clinical Coordinator:                           _x____present   Internal :                      _x____present        Plan of care discussed and updated as appropriate. D/C today.

## 2021-02-12 ENCOUNTER — HOSPITAL ENCOUNTER (EMERGENCY)
Age: 31
Discharge: PSYCHIATRIC OR SUBSTANCE USE FACILITY | End: 2021-02-14
Attending: EMERGENCY MEDICINE
Payer: COMMERCIAL

## 2021-02-12 DIAGNOSIS — F19.10 SUBSTANCE ABUSE (HCC): ICD-10-CM

## 2021-02-12 DIAGNOSIS — R44.3 HALLUCINATION: ICD-10-CM

## 2021-02-12 DIAGNOSIS — R46.89 AGGRESSIVE BEHAVIOR: Primary | ICD-10-CM

## 2021-02-12 LAB
ANION GAP SERPL CALC-SCNC: 3 MMOL/L (ref 3–18)
BASOPHILS # BLD: 0 K/UL (ref 0–0.1)
BASOPHILS NFR BLD: 1 % (ref 0–2)
BUN SERPL-MCNC: 11 MG/DL (ref 7–18)
BUN/CREAT SERPL: 8 (ref 12–20)
CALCIUM SERPL-MCNC: 8.5 MG/DL (ref 8.5–10.1)
CHLORIDE SERPL-SCNC: 105 MMOL/L (ref 100–111)
CO2 SERPL-SCNC: 31 MMOL/L (ref 21–32)
COVID-19 RAPID TEST, COVR: NOT DETECTED
CREAT SERPL-MCNC: 1.33 MG/DL (ref 0.6–1.3)
DIFFERENTIAL METHOD BLD: ABNORMAL
EOSINOPHIL # BLD: 0.1 K/UL (ref 0–0.4)
EOSINOPHIL NFR BLD: 2 % (ref 0–5)
ERYTHROCYTE [DISTWIDTH] IN BLOOD BY AUTOMATED COUNT: 13.4 % (ref 11.6–14.5)
ETHANOL SERPL-MCNC: <3 MG/DL (ref 0–3)
GLUCOSE SERPL-MCNC: 83 MG/DL (ref 74–99)
HCT VFR BLD AUTO: 42.9 % (ref 36–48)
HGB BLD-MCNC: 14.2 G/DL (ref 13–16)
LYMPHOCYTES # BLD: 2.1 K/UL (ref 0.9–3.6)
LYMPHOCYTES NFR BLD: 32 % (ref 21–52)
MCH RBC QN AUTO: 27.4 PG (ref 24–34)
MCHC RBC AUTO-ENTMCNC: 33.1 G/DL (ref 31–37)
MCV RBC AUTO: 82.7 FL (ref 74–97)
MONOCYTES # BLD: 0.8 K/UL (ref 0.05–1.2)
MONOCYTES NFR BLD: 12 % (ref 3–10)
NEUTS SEG # BLD: 3.6 K/UL (ref 1.8–8)
NEUTS SEG NFR BLD: 53 % (ref 40–73)
PLATELET # BLD AUTO: 220 K/UL (ref 135–420)
PMV BLD AUTO: 11.4 FL (ref 9.2–11.8)
POTASSIUM SERPL-SCNC: 3.4 MMOL/L (ref 3.5–5.5)
RBC # BLD AUTO: 5.19 M/UL (ref 4.7–5.5)
SARS-COV-2, COV2: NORMAL
SODIUM SERPL-SCNC: 139 MMOL/L (ref 136–145)
SOURCE, COVRS: NORMAL
WBC # BLD AUTO: 6.7 K/UL (ref 4.6–13.2)

## 2021-02-12 PROCEDURE — 85025 COMPLETE CBC W/AUTO DIFF WBC: CPT

## 2021-02-12 PROCEDURE — 82077 ASSAY SPEC XCP UR&BREATH IA: CPT

## 2021-02-12 PROCEDURE — 99283 EMERGENCY DEPT VISIT LOW MDM: CPT

## 2021-02-12 PROCEDURE — 80048 BASIC METABOLIC PNL TOTAL CA: CPT

## 2021-02-12 PROCEDURE — 87635 SARS-COV-2 COVID-19 AMP PRB: CPT
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
## 2021-02-13 VITALS
HEART RATE: 70 BPM | RESPIRATION RATE: 16 BRPM | SYSTOLIC BLOOD PRESSURE: 149 MMHG | TEMPERATURE: 99 F | DIASTOLIC BLOOD PRESSURE: 80 MMHG | OXYGEN SATURATION: 99 %

## 2021-02-13 LAB
AMPHET UR QL SCN: NEGATIVE
BARBITURATES UR QL SCN: NEGATIVE
BENZODIAZ UR QL: NEGATIVE
CANNABINOIDS UR QL SCN: POSITIVE
COCAINE UR QL SCN: NEGATIVE
HDSCOM,HDSCOM: ABNORMAL
METHADONE UR QL: NEGATIVE
OPIATES UR QL: NEGATIVE
PCP UR QL: NEGATIVE

## 2021-02-13 PROCEDURE — 80307 DRUG TEST PRSMV CHEM ANLYZR: CPT

## 2021-02-13 NOTE — ED NOTES
Patient given lunch tray. He is being cooperative with staff at this time. He is not yelling out or acting out.

## 2021-02-13 NOTE — ED NOTES
Patient continues to rest on stretcher, he is being observed in a camera room. No outburst or any disruptive behavior is noted at this time. Will continue to monitor.
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
## 2021-02-13 NOTE — ED NOTES
7:16 PM   Pt care assumed from Parmjit Brown , ED provider. Pt complaint(s), current treatment plan, progression and available diagnostic results have been discussed thoroughly. Rounding occurred: no  Intended Disposition: ECO in progress and dispo TBD   Pending diagnostic reports and/or labs (please list): CBC, BMP. COVID-19, EtOH, UDS     PROGRESS NOTES:  9:41 PM   Spoke to [facility] as pt's is now medically cleared. Giovani Wick will get patient information as soon as she can. PROGRESS NOTES:  12:30 AM   Pt is in no distress, resting comfortably, no requests, will continue to monitor. TURN OVER NOTE:   2:00 AM   Consulted with Kristi Osborn MD  concerning patient Jarome Lucretia, standard discussion of reason for visit, HPI, ROS, PE, and current results available. Report was given at this time.  Provider above will assume care at his time  Parmjit Moses

## 2021-02-13 NOTE — ED NOTES
Pt gave urine. Will send to lab. Pt is agreeable at this time. Has a few loud out bursts but has been pleasant to staff and security.

## 2021-02-13 NOTE — ED NOTES
7:03 AM turned over to me by Dr. Logan Lewis. The provider for follow-up and disposition. Patient is a 27-year-old male with history of schizophrenia presents with an ECO. Awaiting placement. Turned over to Dr Dominga Loredo for final disposition.

## 2021-02-14 NOTE — ED NOTES
7 8 PM patient turned over to me Dr. Rodney Davis he is still awaiting placement he was seen in his room he is resting comfortably laying in the bed without complaint

## 2021-02-14 NOTE — ED NOTES
Patient taken out of er to be transferred by [facility] to [facility].

## 2024-04-29 ENCOUNTER — HOSPITAL ENCOUNTER (EMERGENCY)
Facility: HOSPITAL | Age: 34
Discharge: HOME | End: 2024-04-29
Attending: EMERGENCY MEDICINE
Payer: COMMERCIAL

## 2024-04-29 VITALS
HEART RATE: 88 BPM | TEMPERATURE: 98 F | RESPIRATION RATE: 18 BRPM | OXYGEN SATURATION: 99 % | SYSTOLIC BLOOD PRESSURE: 133 MMHG | WEIGHT: 150 LBS | BODY MASS INDEX: 21.47 KG/M2 | DIASTOLIC BLOOD PRESSURE: 94 MMHG | HEIGHT: 70 IN

## 2024-04-29 DIAGNOSIS — F20.9 SCHIZOPHRENIA, UNSPECIFIED TYPE (HCC): Primary | ICD-10-CM

## 2024-04-29 DIAGNOSIS — R45.1 AGITATION: ICD-10-CM

## 2024-04-29 PROCEDURE — 99283 EMERGENCY DEPT VISIT LOW MDM: CPT

## 2024-04-29 RX ORDER — LORAZEPAM 1 MG/1
2 TABLET ORAL EVERY EVENING
Qty: 10 TABLET | Refills: 0 | Status: SHIPPED | OUTPATIENT
Start: 2024-04-29 | End: 2024-05-29

## 2024-04-29 RX ORDER — OLANZAPINE 20 MG/1
20 TABLET ORAL NIGHTLY
Qty: 30 TABLET | Refills: 0 | Status: SHIPPED | OUTPATIENT
Start: 2024-04-29 | End: 2024-05-29

## 2024-04-29 RX ORDER — BENZTROPINE MESYLATE 1 MG/1
1 TABLET ORAL 2 TIMES DAILY
Qty: 60 TABLET | Refills: 0 | Status: SHIPPED | OUTPATIENT
Start: 2024-04-29

## 2024-04-29 RX ORDER — DIPHENHYDRAMINE HCL 25 MG
25 TABLET ORAL EVERY 6 HOURS PRN
Qty: 30 TABLET | Refills: 0 | Status: SHIPPED | OUTPATIENT
Start: 2024-04-29 | End: 2024-05-29

## 2024-04-29 RX ORDER — DIVALPROEX SODIUM 250 MG/1
750 TABLET, EXTENDED RELEASE ORAL 2 TIMES DAILY
Qty: 30 TABLET | Refills: 3 | Status: SHIPPED | OUTPATIENT
Start: 2024-04-29

## 2024-04-29 ASSESSMENT — PAIN - FUNCTIONAL ASSESSMENT: PAIN_FUNCTIONAL_ASSESSMENT: NONE - DENIES PAIN

## 2024-04-29 NOTE — ED TRIAGE NOTES
Patient presented to the Emergency Dept with requesting his antipsychotic medication to be refill.    Patient thought content circumstantially and tangentially noted. Patient denies all form of hallucination no noted. Patient denies suicidal or homicidal ideation.    Patient relative states that she would like patient to be evaluated and reports \" patient neglecting hygenic needs, not sleeping, threatening things and talking to unknown stimuli.\"    Patient refusing to check in at this time and states that he just here for medication refill      Patient alert and oriented x 4, patient breathes freely on room air in nil cardiopulmonary distress

## 2024-04-29 NOTE — ED PROVIDER NOTES
Stability: Not on file   Interpersonal Safety: Not on file   Utilities: Not on file       Review of Systems     Negative except as listed above in HPI.    Physical Exam     Vitals:    04/29/24 1344   BP: (!) 133/94   Pulse: 88   Resp: 18   Temp: 98 °F (36.7 °C)   TempSrc: Oral   SpO2: 99%   Weight: 68 kg (150 lb)   Height: 1.778 m (5' 10\")     ***  Constitutional: Non-toxic appearing, no acute distress  Head: Normocephalic, Atraumatic  Neck: Supple  Cardiovascular: Regular rate and rhythm, no murmurs, rubs, or gallops  Chest: Normal work of breathing and chest excursion bilaterally  Lungs: Clear to ausculation bilaterally  Abdomen: Soft, non tender, non distended, normoactive bowel sounds  Back: No evidence of trauma or deformity  Extremities: No evidence of trauma or deformity, no LE edema  Skin: Warm and dry, normal cap refill  Neuro: Alert and appropriate, CN intact, normal speech, strength and sensation full and symmetric bilaterally, normal gait, normal coordination  Psychiatric: Normal mood and affect     OR    Physical Exam      Diagnostic Study Results     Labs:  No results found for this or any previous visit (from the past 12 hour(s)).    Radiologic Studies: ***  No orders to display       EKG interpretation: (Preliminary)  EKG read by Dr. Mulu Sosa at ***     Procedures     Procedures    ED Course     7:04 PM EDT I (Mulu Sosa MD) am the first provider for this patient. Initial assessment performed. I reviewed the vital signs, available nursing notes, past medical history, past surgical history, family history and social history. The patients presenting problems have been discussed, and they are in agreement with the care plan formulated and outlined with them.  I have encouraged them to ask questions as they arise throughout their visit.    Records Reviewed: {Records Reviewed:77997::\"Nursing Notes\"}    Is this patient to be included in the SEP-1 core measure? {Sep-1 Core Yes/No:703591}    MEDICATIONS  responding to internal stimuli.  He is also noted electing his personal hygiene.    The patient is awake, alert, oriented x 4 and I do believe that he has capacity to make his own decisions.    He is refusing to see crisis.  I will refill his medications today.  He will be discharged home and will be advised to follow-up with his regular psychiatrist as soon as possible.  Return precautions have been given.    In addition to his regular medications, I did add Ativan 2 mg nightly for 10 days in order to get the patient sleeping again.    Disposition Considerations (tests considered but not done, Admit vs D/C, Shared Decision Making, Pt Expectation of Test or Tx.): Admit versus DC      Critical Care Time:     0    Mulu Sosa MD    Diagnosis and Disposition     I Mulu Sosa MD am the primary clinician of record.        DIAGNOSIS:   1. Schizophrenia, unspecified type (HCC)    2. Agitation        DISPOSITION        PATIENT REFERRED TO:  Zak Casas MD    Schedule an appointment as soon as possible for a visit in 3 days      [address] [address]  [phone]          DISCHARGE MEDICATIONS:  Discharge Medication List as of 4/29/2024  7:14 PM        START taking these medications    Details   LORazepam (ATIVAN) 1 MG tablet Take 2 tablets by mouth every evening for 30 days. Max Daily Amount: 2 mg, Disp-10 tablet, R-0Normal             DISCONTINUED MEDICATIONS:  Discontinued Medications    No medications on file              (Please note that portions of this note were completed with a voice recognition program.  Efforts were made to edit the dictations but occasionally words are mis-transcribed.)    Mulu Sosa MD (electronically signed)        Dragon Disclaimer     Please note that this dictation was completed with [employer] the [employer] voice recognition software.  Quite often unanticipated grammatical, syntax, homophones, and other interpretive errors are

## 2024-05-16 ENCOUNTER — HOSPITAL ENCOUNTER (INPATIENT)
Facility: HOSPITAL | Age: 34
LOS: 18 days | Discharge: HOME | DRG: 750 | End: 2024-06-04
Attending: EMERGENCY MEDICINE | Admitting: PSYCHIATRY & NEUROLOGY
Payer: COMMERCIAL

## 2024-05-16 DIAGNOSIS — F22 PARANOID STATE (HCC): Primary | ICD-10-CM

## 2024-05-16 PROCEDURE — 99285 EMERGENCY DEPT VISIT HI MDM: CPT

## 2024-05-16 ASSESSMENT — PAIN - FUNCTIONAL ASSESSMENT: PAIN_FUNCTIONAL_ASSESSMENT: NONE - DENIES PAIN

## 2024-05-17 PROBLEM — F25.0 SCHIZOAFFECTIVE DISORDER, BIPOLAR TYPE (HCC): Status: ACTIVE | Noted: 2024-05-17

## 2024-05-17 LAB
ALBUMIN SERPL-MCNC: 3.5 G/DL (ref 3.4–5)
ALBUMIN/GLOB SERPL: 1.2 (ref 0.8–1.7)
ALP SERPL-CCNC: 54 U/L (ref 45–117)
ALT SERPL-CCNC: 16 U/L (ref 16–61)
AMPHET UR QL SCN: NEGATIVE
ANION GAP SERPL CALC-SCNC: 4 MMOL/L (ref 3–18)
APPEARANCE UR: CLEAR
AST SERPL-CCNC: 18 U/L (ref 10–38)
BARBITURATES UR QL SCN: NEGATIVE
BASOPHILS # BLD: 0.1 K/UL (ref 0–0.1)
BASOPHILS NFR BLD: 1 % (ref 0–2)
BENZODIAZ UR QL: NEGATIVE
BILIRUB DIRECT SERPL-MCNC: <0.1 MG/DL (ref 0–0.2)
BILIRUB SERPL-MCNC: 0.3 MG/DL (ref 0.2–1)
BILIRUB UR QL: NEGATIVE
BUN SERPL-MCNC: 11 MG/DL (ref 7–18)
BUN/CREAT SERPL: 11 (ref 12–20)
CALCIUM SERPL-MCNC: 9 MG/DL (ref 8.5–10.1)
CANNABINOIDS UR QL SCN: POSITIVE
CHLORIDE SERPL-SCNC: 105 MMOL/L (ref 100–111)
CO2 SERPL-SCNC: 29 MMOL/L (ref 21–32)
COCAINE UR QL SCN: NEGATIVE
COLOR UR: YELLOW
CREAT SERPL-MCNC: 0.98 MG/DL (ref 0.6–1.3)
DIFFERENTIAL METHOD BLD: ABNORMAL
EKG ATRIAL RATE: 60 BPM
EKG DIAGNOSIS: NORMAL
EKG P AXIS: 52 DEGREES
EKG P-R INTERVAL: 174 MS
EKG Q-T INTERVAL: 396 MS
EKG QRS DURATION: 90 MS
EKG QTC CALCULATION (BAZETT): 396 MS
EKG R AXIS: 64 DEGREES
EKG T AXIS: 61 DEGREES
EKG VENTRICULAR RATE: 60 BPM
EOSINOPHIL # BLD: 0.2 K/UL (ref 0–0.4)
EOSINOPHIL NFR BLD: 2 % (ref 0–5)
ERYTHROCYTE [DISTWIDTH] IN BLOOD BY AUTOMATED COUNT: 14.9 % (ref 11.6–14.5)
ETHANOL SERPL-MCNC: 3 MG/DL (ref 0–3)
FLUAV RNA SPEC QL NAA+PROBE: NOT DETECTED
FLUBV RNA SPEC QL NAA+PROBE: NOT DETECTED
GLOBULIN SER CALC-MCNC: 3 G/DL (ref 2–4)
GLUCOSE SERPL-MCNC: 86 MG/DL (ref 74–99)
GLUCOSE UR STRIP.AUTO-MCNC: NEGATIVE MG/DL
HCT VFR BLD AUTO: 45.6 % (ref 36–48)
HGB BLD-MCNC: 14.9 G/DL (ref 13–16)
HGB UR QL STRIP: NEGATIVE
IMM GRANULOCYTES # BLD AUTO: 0 K/UL (ref 0–0.04)
IMM GRANULOCYTES NFR BLD AUTO: 0 % (ref 0–0.5)
KETONES UR QL STRIP.AUTO: NEGATIVE MG/DL
LEUKOCYTE ESTERASE UR QL STRIP.AUTO: NEGATIVE
LYMPHOCYTES # BLD: 3.8 K/UL (ref 0.9–3.6)
LYMPHOCYTES NFR BLD: 35 % (ref 21–52)
Lab: ABNORMAL
MCH RBC QN AUTO: 28.2 PG (ref 24–34)
MCHC RBC AUTO-ENTMCNC: 32.7 G/DL (ref 31–37)
MCV RBC AUTO: 86.2 FL (ref 78–100)
METHADONE UR QL: NEGATIVE
MONOCYTES # BLD: 1 K/UL (ref 0.05–1.2)
MONOCYTES NFR BLD: 9 % (ref 3–10)
NEUTS SEG # BLD: 5.8 K/UL (ref 1.8–8)
NEUTS SEG NFR BLD: 53 % (ref 40–73)
NITRITE UR QL STRIP.AUTO: NEGATIVE
NRBC # BLD: 0 K/UL (ref 0–0.01)
NRBC BLD-RTO: 0 PER 100 WBC
OPIATES UR QL: NEGATIVE
PCP UR QL: NEGATIVE
PH UR STRIP: 8.5 (ref 5–8)
PLATELET # BLD AUTO: 173 K/UL (ref 135–420)
PMV BLD AUTO: 12.1 FL (ref 9.2–11.8)
POTASSIUM SERPL-SCNC: 4.3 MMOL/L (ref 3.5–5.5)
PROT SERPL-MCNC: 6.5 G/DL (ref 6.4–8.2)
PROT UR STRIP-MCNC: NEGATIVE MG/DL
RBC # BLD AUTO: 5.29 M/UL (ref 4.35–5.65)
SARS-COV-2 RNA RESP QL NAA+PROBE: NOT DETECTED
SODIUM SERPL-SCNC: 138 MMOL/L (ref 136–145)
SP GR UR REFRACTOMETRY: 1.01 (ref 1–1.03)
UROBILINOGEN UR QL STRIP.AUTO: 1 EU/DL (ref 0.2–1)
VALPROATE SERPL-MCNC: 77 UG/ML (ref 50–100)
WBC # BLD AUTO: 10.8 K/UL (ref 4.6–13.2)

## 2024-05-17 PROCEDURE — 90792 PSYCH DIAG EVAL W/MED SRVCS: CPT | Performed by: NURSE PRACTITIONER

## 2024-05-17 PROCEDURE — 6370000000 HC RX 637 (ALT 250 FOR IP)

## 2024-05-17 PROCEDURE — 82077 ASSAY SPEC XCP UR&BREATH IA: CPT

## 2024-05-17 PROCEDURE — 81003 URINALYSIS AUTO W/O SCOPE: CPT

## 2024-05-17 PROCEDURE — 80076 HEPATIC FUNCTION PANEL: CPT

## 2024-05-17 PROCEDURE — 80164 ASSAY DIPROPYLACETIC ACD TOT: CPT

## 2024-05-17 PROCEDURE — 80048 BASIC METABOLIC PNL TOTAL CA: CPT

## 2024-05-17 PROCEDURE — 93005 ELECTROCARDIOGRAM TRACING: CPT | Performed by: STUDENT IN AN ORGANIZED HEALTH CARE EDUCATION/TRAINING PROGRAM

## 2024-05-17 PROCEDURE — 6370000000 HC RX 637 (ALT 250 FOR IP): Performed by: PSYCHIATRY & NEUROLOGY

## 2024-05-17 PROCEDURE — 1240000000 HC EMOTIONAL WELLNESS R&B

## 2024-05-17 PROCEDURE — 80307 DRUG TEST PRSMV CHEM ANLYZR: CPT

## 2024-05-17 PROCEDURE — 87636 SARSCOV2 & INF A&B AMP PRB: CPT

## 2024-05-17 PROCEDURE — 93010 ELECTROCARDIOGRAM REPORT: CPT | Performed by: INTERNAL MEDICINE

## 2024-05-17 PROCEDURE — 85025 COMPLETE CBC W/AUTO DIFF WBC: CPT

## 2024-05-17 PROCEDURE — 94761 N-INVAS EAR/PLS OXIMETRY MLT: CPT

## 2024-05-17 RX ORDER — MAGNESIUM HYDROXIDE/ALUMINUM HYDROXICE/SIMETHICONE 120; 1200; 1200 MG/30ML; MG/30ML; MG/30ML
30 SUSPENSION ORAL EVERY 6 HOURS PRN
Status: DISCONTINUED | OUTPATIENT
Start: 2024-05-17 | End: 2024-06-04 | Stop reason: HOSPADM

## 2024-05-17 RX ORDER — BENZTROPINE MESYLATE 1 MG/ML
1 INJECTION INTRAMUSCULAR; INTRAVENOUS EVERY 6 HOURS PRN
Status: DISCONTINUED | OUTPATIENT
Start: 2024-05-17 | End: 2024-06-04 | Stop reason: HOSPADM

## 2024-05-17 RX ORDER — MULTIVITAMIN WITH IRON
1 TABLET ORAL DAILY
Status: DISCONTINUED | OUTPATIENT
Start: 2024-05-17 | End: 2024-06-04 | Stop reason: HOSPADM

## 2024-05-17 RX ORDER — LORAZEPAM 1 MG/1
1 TABLET ORAL EVERY 4 HOURS PRN
Status: DISCONTINUED | OUTPATIENT
Start: 2024-05-17 | End: 2024-05-20

## 2024-05-17 RX ORDER — HALOPERIDOL 5 MG/ML
5 INJECTION INTRAMUSCULAR EVERY 6 HOURS PRN
Status: DISCONTINUED | OUTPATIENT
Start: 2024-05-17 | End: 2024-06-04 | Stop reason: HOSPADM

## 2024-05-17 RX ORDER — LORAZEPAM 2 MG/ML
1 INJECTION INTRAMUSCULAR EVERY 4 HOURS PRN
Status: DISCONTINUED | OUTPATIENT
Start: 2024-05-17 | End: 2024-05-20

## 2024-05-17 RX ORDER — HYDROXYZINE 50 MG/1
25 TABLET, FILM COATED ORAL EVERY 6 HOURS PRN
Status: DISCONTINUED | OUTPATIENT
Start: 2024-05-17 | End: 2024-05-17

## 2024-05-17 RX ORDER — HYDROXYZINE PAMOATE 50 MG/1
50 CAPSULE ORAL EVERY 6 HOURS PRN
Status: DISCONTINUED | OUTPATIENT
Start: 2024-05-17 | End: 2024-06-04 | Stop reason: HOSPADM

## 2024-05-17 RX ORDER — GAUZE BANDAGE 2" X 2"
100 BANDAGE TOPICAL DAILY
Status: DISCONTINUED | OUTPATIENT
Start: 2024-05-17 | End: 2024-05-20

## 2024-05-17 RX ORDER — TRAZODONE HYDROCHLORIDE 50 MG/1
50 TABLET ORAL NIGHTLY PRN
Status: DISCONTINUED | OUTPATIENT
Start: 2024-05-17 | End: 2024-05-22

## 2024-05-17 RX ORDER — BENZTROPINE MESYLATE 1 MG/1
1 TABLET ORAL EVERY 6 HOURS PRN
Status: DISCONTINUED | OUTPATIENT
Start: 2024-05-17 | End: 2024-06-04 | Stop reason: HOSPADM

## 2024-05-17 RX ORDER — HALOPERIDOL 5 MG/1
5 TABLET ORAL EVERY 6 HOURS PRN
Status: DISCONTINUED | OUTPATIENT
Start: 2024-05-17 | End: 2024-06-04 | Stop reason: HOSPADM

## 2024-05-17 RX ORDER — CETIRIZINE HYDROCHLORIDE 10 MG/1
10 TABLET ORAL DAILY
Status: DISCONTINUED | OUTPATIENT
Start: 2024-05-17 | End: 2024-06-04 | Stop reason: HOSPADM

## 2024-05-17 RX ORDER — FLUTICASONE PROPIONATE 50 MCG
2 SPRAY, SUSPENSION (ML) NASAL DAILY PRN
Status: DISCONTINUED | OUTPATIENT
Start: 2024-05-17 | End: 2024-06-04 | Stop reason: HOSPADM

## 2024-05-17 RX ORDER — OLANZAPINE 5 MG/1
5 TABLET ORAL 2 TIMES DAILY
Status: DISCONTINUED | OUTPATIENT
Start: 2024-05-17 | End: 2024-05-20

## 2024-05-17 RX ORDER — ACETAMINOPHEN 325 MG/1
650 TABLET ORAL EVERY 4 HOURS PRN
Status: DISCONTINUED | OUTPATIENT
Start: 2024-05-17 | End: 2024-06-04 | Stop reason: HOSPADM

## 2024-05-17 RX ORDER — DIVALPROEX SODIUM 250 MG/1
250 TABLET, EXTENDED RELEASE ORAL 2 TIMES DAILY
Status: DISCONTINUED | OUTPATIENT
Start: 2024-05-17 | End: 2024-05-20

## 2024-05-17 RX ORDER — LORAZEPAM 1 MG/1
2 TABLET ORAL EVERY 4 HOURS PRN
Status: DISCONTINUED | OUTPATIENT
Start: 2024-05-17 | End: 2024-05-20

## 2024-05-17 RX ORDER — LORAZEPAM 2 MG/ML
2 INJECTION INTRAMUSCULAR EVERY 4 HOURS PRN
Status: DISCONTINUED | OUTPATIENT
Start: 2024-05-17 | End: 2024-05-20

## 2024-05-17 RX ORDER — BENZTROPINE MESYLATE 1 MG/1
1 TABLET ORAL DAILY
Status: DISCONTINUED | OUTPATIENT
Start: 2024-05-18 | End: 2024-06-04 | Stop reason: HOSPADM

## 2024-05-17 RX ADMIN — LORAZEPAM 1 MG: 1 TABLET ORAL at 20:11

## 2024-05-17 RX ADMIN — CETIRIZINE HYDROCHLORIDE 10 MG: 10 TABLET, FILM COATED ORAL at 20:30

## 2024-05-17 RX ADMIN — DIVALPROEX SODIUM 250 MG: 250 TABLET, FILM COATED, EXTENDED RELEASE ORAL at 21:00

## 2024-05-17 RX ADMIN — HYDROXYZINE PAMOATE 50 MG: 50 CAPSULE ORAL at 18:49

## 2024-05-17 RX ADMIN — OLANZAPINE 5 MG: 5 TABLET, FILM COATED ORAL at 20:13

## 2024-05-17 ASSESSMENT — SLEEP AND FATIGUE QUESTIONNAIRES
DO YOU USE A SLEEP AID: NO
AVERAGE NUMBER OF SLEEP HOURS: 5
DO YOU HAVE DIFFICULTY SLEEPING: NO

## 2024-05-17 ASSESSMENT — LIFESTYLE VARIABLES
HOW OFTEN DO YOU HAVE A DRINK CONTAINING ALCOHOL: MONTHLY OR LESS
HOW MANY STANDARD DRINKS CONTAINING ALCOHOL DO YOU HAVE ON A TYPICAL DAY: 1 OR 2

## 2024-05-17 ASSESSMENT — PAIN SCALES - GENERAL
PAINLEVEL_OUTOF10: 0

## 2024-05-17 NOTE — VIRTUAL HEALTH
Teddy Moore  [id number]  1990     Social Work Behavioral Health Crisis Assessment    05/17/24    Chief Complaint: Acute psychosis    HPI: Patient is a 33 y.o. Black /  male who presents for acute psychosis. Patient presented to the ED on 05/17/24 from home.    Past Psychiatric History:  Previous Diagnoses/symptoms: Acute psychosis, Schizophrenia, Substance Use Disorder  Previous suicide attempts/self-harm: Denies  Inpatient psychiatric hospitalizations: unknown  [facility]  Current therapist: Patient can't remember the name of the therapist  Previous psychiatric medication trials: No prior medication trials  Current psychiatric medications: No current psychiatric medications  Family Psychiatric History: Unknown    Sleep Hours: 8 hours    Sleep concerns:  Difficulty attaining and maintaining sleep    Use of sleep medications: denies    Substance Abuse History:  Tobacco: Endorses  cigarettes  Alcohol: Denies  Marijuana: Denies  Stimulant: Denies  Opiates: Denies  Benzodiazepine: Denies  Other illicit drug usage: Denies  History of substance/alcohol abuse treatment: Denies    Social History:  Education: Did not answer  Living Situation/Interest: with family  Marital/Committed relationship and parenting hx: single  Occupation: Disabled  Legal History/Hx of Violence: Denies  Spiritual History: Confucianism  Psychological trauma, neglect, or abuse: denies hx of trauma/abuse   Access to guns or other weapons: denies having access to firearms/dangerous weapons     Past Medical History:  Active Ambulatory Problems     Diagnosis Date Noted    Tobacco abuse 05/27/2014    Schizoaffective disorder (HCC) 05/30/2014    Cannabis abuse, continuous 12/23/2015     Resolved Ambulatory Problems     Diagnosis Date Noted    No Resolved Ambulatory Problems     Past Medical History:   Diagnosis Date    Aggressive outburst     Nina (HCC)     Schizophrenia (HCC)     Substance

## 2024-05-17 NOTE — CONSULTS
K/uL    Neutrophils % 53 40 - 73 %    Lymphocytes % 35 21 - 52 %    Monocytes % 9 3 - 10 %    Eosinophils % 2 0 - 5 %    Basophils % 1 0 - 2 %    Immature Granulocytes % 0 0.0 - 0.5 %    Neutrophils Absolute 5.8 1.8 - 8.0 K/UL    Lymphocytes Absolute 3.8 (H) 0.9 - 3.6 K/UL    Monocytes Absolute 1.0 0.05 - 1.2 K/UL    Eosinophils Absolute 0.2 0.0 - 0.4 K/UL    Basophils Absolute 0.1 0.0 - 0.1 K/UL    Immature Granulocytes Absolute 0.0 0.00 - 0.04 K/UL    Differential Type AUTOMATED     Basic Metabolic Panel    Collection Time: 05/17/24  2:20 AM   Result Value Ref Range    Sodium 138 136 - 145 mmol/L    Potassium 4.3 3.5 - 5.5 mmol/L    Chloride 105 100 - 111 mmol/L    CO2 29 21 - 32 mmol/L    Anion Gap 4 3.0 - 18 mmol/L    Glucose 86 74 - 99 mg/dL    BUN 11 7.0 - 18 MG/DL    Creatinine 0.98 0.6 - 1.3 MG/DL    BUN/Creatinine Ratio 11 (L) 12 - 20      Est, Glom Filt Rate >90 >60 ml/min/1.73m2    Calcium 9.0 8.5 - 10.1 MG/DL   Ethanol    Collection Time: 05/17/24  2:20 AM   Result Value Ref Range    Ethanol Lvl 3 0 - 3 MG/DL   COVID-19 & Influenza Combo    Collection Time: 05/17/24  2:20 AM    Specimen: Nasopharyngeal   Result Value Ref Range    SARS-CoV-2, PCR Not detected NOTD      Rapid Influenza A By PCR Not detected NOTD      Rapid Influenza B By PCR Not detected NOTD     Valproic Acid Level, Total    Collection Time: 05/17/24  2:20 AM   Result Value Ref Range    Valproic Acid 77 50 - 100 ug/ml   Hepatic Function Panel    Collection Time: 05/17/24  2:20 AM   Result Value Ref Range    Total Protein 6.5 6.4 - 8.2 g/dL    Albumin 3.5 3.4 - 5.0 g/dL    Globulin 3.0 2.0 - 4.0 g/dL    Albumin/Globulin Ratio 1.2 0.8 - 1.7      Total Bilirubin 0.3 0.2 - 1.0 MG/DL    Bilirubin, Direct <0.1 0.0 - 0.2 MG/DL    Alk Phosphatase 54 45 - 117 U/L    AST 18 10 - 38 U/L    ALT 16 16 - 61 U/L   Urine Drug Screen    Collection Time: 05/17/24  5:05 AM   Result Value Ref Range    Benzodiazepines, Urine Negative NEG      Barbiturates,

## 2024-05-17 NOTE — ED PROVIDER NOTES
I assumed care of the patient primarily from MADIHA Paniagua at the end of her shift.  The patient is a 33-year-old male who was brought to the ED under an ECO and is awaiting placement through [facility] at this time.       Mulu Sosa MD  05/23/24 1134    
Patient about a psychiatry recommended inpatient placement.  A consultation was placed to Andres Sanchez MD  05/17/24 3273       Andres Thompson MD  05/17/24 4951    
pertinent surgical history.    Family History:  Family History   Problem Relation Age of Onset    Bipolar Disorder Maternal Aunt        Social History:   Social History     Tobacco Use    Smoking status: Every Day     Current packs/day: 1.00     Types: Cigarettes    Smokeless tobacco: Never   Substance Use Topics    Alcohol use: No    Drug use: No       Allergies:  Allergies   Allergen Reactions    Lithium Other (See Comments)     Pt has no history of this medication on file of being dispensed.       PMH, PSH, family history, social history, allergies reviewed with the patient with significant items noted above.  Review of Systems   Review of Systems   Constitutional:  Negative for appetite change and fatigue.   HENT:  Negative for rhinorrhea.    Respiratory:  Negative for shortness of breath.    Cardiovascular:  Negative for chest pain.   Gastrointestinal:  Negative for abdominal pain, diarrhea and vomiting.   Genitourinary:  Negative for dysuria and penile discharge.   Musculoskeletal:  Negative for arthralgias and myalgias.   Skin:  Negative for color change and wound.   Neurological:  Negative for dizziness and light-headedness.   Psychiatric/Behavioral:  Negative for suicidal ideas.    All other systems reviewed and are negative.      Physical Exam     Vitals:    05/16/24 2318   BP: 129/72   Pulse: 72   Resp: 17   Temp: 98.1 °F (36.7 °C)   TempSrc: Oral   SpO2: 99%   Weight: 68 kg (150 lb)   Height: 1.778 m (5' 10\")       Physical Exam  Vitals and nursing note reviewed.   Constitutional:       Appearance: Normal appearance.      Comments: Pt in NAD  Disheveled, Malodorous   HENT:      Head: Normocephalic and atraumatic.   Eyes:      Conjunctiva/sclera: Conjunctivae normal.   Cardiovascular:      Rate and Rhythm: Normal rate and regular rhythm.   Pulmonary:      Effort: Pulmonary effort is normal.      Breath sounds: Normal breath sounds.   Abdominal:      General: Bowel sounds are normal.      Palpations:

## 2024-05-17 NOTE — BSMART NOTE
Crisis note:    Met with patient in the ER. Patient lying calmly on ED bed, right wrist in forensic restraint. Patient informed that he is being admitted psychiatrically on a TDO. Asked patient if he was admitted if he would try to fight staff, become aggressive, or try to harm self, patient denied all of these. Patient stated he wanted to go to [place]. Clarified with patient if he was talking about [facility] and he said yes, he wants to go to [facility]. Patient informed that would not happen as he is being admitted here at [facility]. He replied \"can I go to [facility] from there.\" Patient recognized this writer and began speaking about knowing this writer, mentioned something about a doctor's office when we were young. Informed patient that I do recognized him from patient treatments here at [facility] many years ago. Patient's thoughts are disjointed and his conversation makes little sense. Patient did talk about camera's and microphones being in the room.

## 2024-05-17 NOTE — ED NOTES
Bedside shift change report given to Faith (oncoming nurse) by rick (offgoing nurse). Report included the following information ED SBAR.

## 2024-05-17 NOTE — ED NOTES
Turnover from Dr. Sosa.  33-year-old male here for psychiatric issues.  Under TDO by police.  On my evaluation patient is resting complaint bed.  Disposition pending psychiatric teamrecommendations.     Andres Thompson MD  05/17/24 0723       Andres Thompson MD  05/17/24 4806

## 2024-05-17 NOTE — ED NOTES
Pt refusing blood to be drawn at this time.  States he is a Sikh and does not want punctures to his body

## 2024-05-17 NOTE — ED NOTES
Case discussed with psychiatry.  Patient will be admitted to our facility.     Andres Thompson MD  05/17/24 1395

## 2024-05-17 NOTE — BSMART NOTE
Crisis Note: Writer spoke with [facility] on call nurse practitioner, Tien, who declined patient due to acuity.

## 2024-05-17 NOTE — BSMART NOTE
Chief Complaint   Patient presents with    Mental Health Problem       Patient was evaluated by Tele-Psych who recommends inpatient psychiatric treatment for delusions and paranoia     Patient is not voluntary inpatient treatment.    Past Medical History:   Diagnosis Date    Aggressive outburst     Nina (HCC)     Schizophrenia (HCC)     Substance abuse (HCC)        Prior to Visit Medications    Medication Sig Taking? Authorizing Provider   LORazepam (ATIVAN) 1 MG tablet Take 2 tablets by mouth every evening for 30 days. Max Daily Amount: 2 mg  Mulu Sosa MD   divalproex (DEPAKOTE ER) 250 MG extended release tablet Take 3 tablets by mouth in the morning and at bedtime  Mulu Sosa MD   diphenhydrAMINE (BANOPHEN) 25 MG tablet Take 1 tablet by mouth every 6 hours as needed for Itching  Mulu Sosa MD   OLANZapine (ZYPREXA) 20 MG tablet Take 1 tablet by mouth nightly  Mulu Sosa MD   benztropine (COGENTIN) 1 MG tablet Take 1 tablet by mouth 2 times daily  Mulu oSsa MD   divalproex (DEPAKOTE) 500 MG DR tablet Take 500 mg by mouth 2 times daily  Automatic Reconciliation, Ar   OLANZapine (ZYPREXA) 15 MG tablet Take 15 mg by mouth every evening  Automatic Reconciliation, Ar         The following labs and vitals were reviewed with on-call psychiatrist.    CMP, ETOH, UDS, UA, SARS- CoV-2-PCR, EKG, and depakote level    Vitals:    05/16/24 2318   BP: 129/72   Pulse: 72   Resp: 17   Temp: 98.1 °F (36.7 °C)   SpO2: 99%         Writer met with patient to assess the following    Ambulation - Patient reports ability to ambulate without difficulty or the use of any assistive devices.    ADLs - Patient able to perform own ADLs without assistance.    DME - Patient requires none.    In consultation with on call provider Michelle IRIZARRY. Patient has been accepted for admission, admit to Dr. Martinez's service.

## 2024-05-17 NOTE — VIRTUAL HEALTH
Teddy Moore, was evaluated through a synchronous (real-time) audio-video encounter. The patient (and/or guardian if applicable) is aware that this is a billable service, which includes applicable co-pays. This virtual visit was conducted with patient's (and/or legal guardian's) consent. Patient identification was verified, and a caregiver was present when appropriate.  The patient was located at Facility (Appt Department): [facility] ([place], [place]) EMERGENCY DEPT  [address] [address]  Loc: [phone]  The provider was located at Home (City/State): [place]  Confirm you are appropriately licensed, registered, or certified to deliver care in the state where the patient is located as indicated above. If you are not or unsure, please re-schedule the visit: Yes, I confirm.   [place] Consult to Tele-pysch Provider  Consult performed by: Shikha Sanchez APRN - CNP  Consult ordered by: Hien Lam LCSW        Teddy Moore  [id number]  1990     EMERGENCY DEPARTMENT TELEPSYCHIATRY EVALUATION    05/17/24    HPI: Patient is a 33 y.o.  male who presents for psychiatric evaluation due to being in psychotic state.. Patient presented to the ED on 05/17/24 from home.The patient has a significant past medical history of schizoaffective disorder, bipolar disorder and substance abuse. He presented under police custody. PD states patient's mom presented with patient to  to get a TDO.  Per mom patient was becoming aggressive with her.  Per ED provider documentation-Patient concerned of blood draw since he is [place] and he states \"I want someone to  me one day and if I give my blood a women will never agree to  me\". Patient also concerned about \"WINIF[employer] cameras in the room\".    The patient was brought in to the ED on 5/6/24 by his sister for refills on his mental health medications. His sister reported that his mental health status is declining and that he

## 2024-05-18 PROCEDURE — 1240000000 HC EMOTIONAL WELLNESS R&B

## 2024-05-18 PROCEDURE — 6370000000 HC RX 637 (ALT 250 FOR IP): Performed by: PSYCHIATRY & NEUROLOGY

## 2024-05-18 PROCEDURE — 99221 1ST HOSP IP/OBS SF/LOW 40: CPT | Performed by: PSYCHIATRY & NEUROLOGY

## 2024-05-18 PROCEDURE — 6370000000 HC RX 637 (ALT 250 FOR IP)

## 2024-05-18 RX ADMIN — Medication 100 MG: at 07:59

## 2024-05-18 RX ADMIN — THERA TABS 1 TABLET: TAB at 07:59

## 2024-05-18 RX ADMIN — HYDROXYZINE PAMOATE 50 MG: 50 CAPSULE ORAL at 14:09

## 2024-05-18 RX ADMIN — BENZTROPINE MESYLATE 1 MG: 1 TABLET ORAL at 08:04

## 2024-05-18 RX ADMIN — HYDROXYZINE PAMOATE 50 MG: 50 CAPSULE ORAL at 08:04

## 2024-05-18 RX ADMIN — OLANZAPINE 5 MG: 5 TABLET, FILM COATED ORAL at 20:08

## 2024-05-18 RX ADMIN — DIVALPROEX SODIUM 250 MG: 250 TABLET, FILM COATED, EXTENDED RELEASE ORAL at 21:00

## 2024-05-18 RX ADMIN — DIVALPROEX SODIUM 250 MG: 250 TABLET, FILM COATED, EXTENDED RELEASE ORAL at 07:59

## 2024-05-18 RX ADMIN — CETIRIZINE HYDROCHLORIDE 10 MG: 10 TABLET, FILM COATED ORAL at 07:58

## 2024-05-18 RX ADMIN — OLANZAPINE 5 MG: 5 TABLET, FILM COATED ORAL at 07:58

## 2024-05-18 ASSESSMENT — PAIN SCALES - GENERAL
PAINLEVEL_OUTOF10: 0

## 2024-05-18 NOTE — GROUP NOTE
Art Therapy Group Progress Note    PATIENT SCHEDULED FOR GROUP AT: 12:00 PM    GROUP STOP TIME:  12:45 PM     ATTENDANCE: Low (4/13 participants)     TOPIC / FOCUS:  [place] in the [place]     GOALS:  promote positive thinking skills, encourage self-esteem, identify positive traits in self, develop communication skills, promote goal setting skills, encourage focus, increase self-awareness, promote creativity    PARTICIPATION LEVEL:  Pt did not attend.     Mika Bustillos  Art Therapist, MA, ATR-P  Provisional Registered Art Therapist

## 2024-05-19 PROCEDURE — 6370000000 HC RX 637 (ALT 250 FOR IP)

## 2024-05-19 PROCEDURE — 99231 SBSQ HOSP IP/OBS SF/LOW 25: CPT | Performed by: PSYCHIATRY & NEUROLOGY

## 2024-05-19 PROCEDURE — 6370000000 HC RX 637 (ALT 250 FOR IP): Performed by: PSYCHIATRY & NEUROLOGY

## 2024-05-19 PROCEDURE — 1240000000 HC EMOTIONAL WELLNESS R&B

## 2024-05-19 RX ADMIN — HALOPERIDOL 5 MG: 5 TABLET ORAL at 19:21

## 2024-05-19 RX ADMIN — HALOPERIDOL 5 MG: 5 TABLET ORAL at 16:42

## 2024-05-19 RX ADMIN — HYDROXYZINE PAMOATE 50 MG: 50 CAPSULE ORAL at 16:41

## 2024-05-19 RX ADMIN — HYDROXYZINE PAMOATE 50 MG: 50 CAPSULE ORAL at 08:43

## 2024-05-19 RX ADMIN — HALOPERIDOL 5 MG: 5 TABLET ORAL at 12:53

## 2024-05-19 RX ADMIN — DIVALPROEX SODIUM 250 MG: 250 TABLET, FILM COATED, EXTENDED RELEASE ORAL at 21:03

## 2024-05-19 RX ADMIN — OLANZAPINE 5 MG: 5 TABLET, FILM COATED ORAL at 08:43

## 2024-05-19 RX ADMIN — THERA TABS 1 TABLET: TAB at 08:43

## 2024-05-19 RX ADMIN — OLANZAPINE 5 MG: 5 TABLET, FILM COATED ORAL at 21:03

## 2024-05-19 RX ADMIN — TRAZODONE HYDROCHLORIDE 50 MG: 50 TABLET ORAL at 21:03

## 2024-05-19 RX ADMIN — BENZTROPINE MESYLATE 1 MG: 1 TABLET ORAL at 19:21

## 2024-05-19 RX ADMIN — BENZTROPINE MESYLATE 1 MG: 1 TABLET ORAL at 08:43

## 2024-05-19 RX ADMIN — CETIRIZINE HYDROCHLORIDE 10 MG: 10 TABLET, FILM COATED ORAL at 08:43

## 2024-05-19 RX ADMIN — Medication 100 MG: at 08:43

## 2024-05-19 RX ADMIN — DIVALPROEX SODIUM 250 MG: 250 TABLET, FILM COATED, EXTENDED RELEASE ORAL at 08:43

## 2024-05-19 ASSESSMENT — PAIN SCALES - GENERAL
PAINLEVEL_OUTOF10: 0
PAINLEVEL_OUTOF10: 0

## 2024-05-20 PROBLEM — F12.20 CANNABIS USE DISORDER, MODERATE, DEPENDENCE (HCC): Status: ACTIVE | Noted: 2024-05-20

## 2024-05-20 PROCEDURE — 1240000000 HC EMOTIONAL WELLNESS R&B

## 2024-05-20 PROCEDURE — 6370000000 HC RX 637 (ALT 250 FOR IP): Performed by: PSYCHIATRY & NEUROLOGY

## 2024-05-20 PROCEDURE — 99231 SBSQ HOSP IP/OBS SF/LOW 25: CPT | Performed by: PSYCHIATRY & NEUROLOGY

## 2024-05-20 PROCEDURE — 6370000000 HC RX 637 (ALT 250 FOR IP)

## 2024-05-20 RX ORDER — DIVALPROEX SODIUM 500 MG/1
500 TABLET, EXTENDED RELEASE ORAL 2 TIMES DAILY
Status: DISCONTINUED | OUTPATIENT
Start: 2024-05-20 | End: 2024-05-27

## 2024-05-20 RX ORDER — OLANZAPINE 5 MG/1
5 TABLET ORAL DAILY
Status: DISCONTINUED | OUTPATIENT
Start: 2024-05-21 | End: 2024-05-24

## 2024-05-20 RX ORDER — OLANZAPINE 10 MG/1
10 TABLET ORAL NIGHTLY
Status: DISCONTINUED | OUTPATIENT
Start: 2024-05-20 | End: 2024-05-24

## 2024-05-20 RX ADMIN — OLANZAPINE 5 MG: 5 TABLET, FILM COATED ORAL at 08:09

## 2024-05-20 RX ADMIN — OLANZAPINE 10 MG: 10 TABLET, FILM COATED ORAL at 19:59

## 2024-05-20 RX ADMIN — DIVALPROEX SODIUM 250 MG: 250 TABLET, FILM COATED, EXTENDED RELEASE ORAL at 08:09

## 2024-05-20 RX ADMIN — HALOPERIDOL 5 MG: 5 TABLET ORAL at 10:07

## 2024-05-20 RX ADMIN — BENZTROPINE MESYLATE 1 MG: 1 TABLET ORAL at 08:09

## 2024-05-20 RX ADMIN — THERA TABS 1 TABLET: TAB at 08:09

## 2024-05-20 RX ADMIN — HYDROXYZINE PAMOATE 50 MG: 50 CAPSULE ORAL at 11:13

## 2024-05-20 RX ADMIN — DIVALPROEX SODIUM 500 MG: 500 TABLET, FILM COATED, EXTENDED RELEASE ORAL at 21:00

## 2024-05-20 RX ADMIN — CETIRIZINE HYDROCHLORIDE 10 MG: 10 TABLET, FILM COATED ORAL at 08:08

## 2024-05-20 RX ADMIN — Medication 100 MG: at 08:08

## 2024-05-20 ASSESSMENT — PAIN SCALES - GENERAL
PAINLEVEL_OUTOF10: 0

## 2024-05-20 NOTE — GROUP NOTE
Group Therapy Note    Date: 5/20/2024    Group Start Time: 1500  Group End Time: 1550  Group Topic: Music Therapy      Liliana Montague        Group Therapy Note    Attendees: 8/13    Group Focus: Music therapy group explored future-focused thinking and personally meaningful songs. Group members listened to and discussed a song with lyrics, \"today is where your book begins, the rest is still unwritten,\" and were encouraged to title the book of their life or their next chapter. Group members then selected songs that were meaningful to them to listen to and discuss, and therapist assisted with processing. The group may promote self-expression, future-focused thinking, and use of music as a coping skill.       Notes:  Patient did not attend group, appearing to be sleeping when invited to group.      Discipline Responsible: /Counselor      Signature:  ALEXIS Ward, JMI  Board-Certified Music Therapist  Licensed Professional Counselor

## 2024-05-20 NOTE — GROUP NOTE
Group Therapy Note    Date: 5/20/2024    Group Start Time: 0930  Group End Time: 1015  Group Topic: Recreational    [facility] ADULT    Flor Paniagua        Group Therapy Note    Attendees: 8/13      Group type: Exercise and Relaxation     Group Focus: This recreational group engaged patients in physical activity, therapist lead group members in guided exercises. Patients engaged in relaxation following guided exercises, prompting patients to focus there minds on a setting they would go to help them relax. This group may help reduce feelings of depression and stress and enhance mood and over emotional well-being.            Notes:  Patient actively participated in group, completing guided exercises and sharing in discussions. Patient reported he felt \"alright\" at the start of group. Patient shared benefits of being physical, and stated he enjoys weight lifting. When discussing and picking a setting for relaxation, patient stated he relax's anywhere that has water and gave a description of how the sounds of the water is soothing.     Status After Intervention:  Unchanged    Participation Level: Active Listener and Interactive    Participation Quality: Appropriate, Attentive, Sharing, and Supportive      Speech:  normal      Thought Process/Content: Logical      Affective Functioning: Congruent      Mood: euthymic      Level of consciousness:  Alert and Attentive      Response to Learning: Able to verbalize current knowledge/experience      Endings: None Reported    Modes of Intervention: Socialization, Activity, Movement, and Media      Recreational Therapist  FLOR PANIAGUA

## 2024-05-20 NOTE — BH NOTE
Upon start of this shirt pt refused vital signs stating \"get away from me.\" RN notified. Pt expresses no other concerns at this time.

## 2024-05-20 NOTE — BH NOTE
Pt took a while to fall asleep. Pt was up early talking to themselves in their room. Pt slept about 4.75 hours.

## 2024-05-20 NOTE — GROUP NOTE
Art Therapy Group Progress Note    PATIENT SCHEDULED FOR GROUP AT: 1:00 PM    GROUP STOP TIME:  1:45 PM     ATTENDANCE: High (9/13 participants)     TOPIC / FOCUS: Tree of Strength     GOALS:  identify strengths, identify ways to use strengths as positive coping skills, increase self-esteem, increase personal awareness, promote creativity, encourage focus, increase distress tolerance     PARTICIPATION LEVEL:  Pt did not attend.     Mika Bustillos  Art Therapist, MA, ATR-P  Provisional Registered Art Therapist

## 2024-05-20 NOTE — BH NOTE
Court hearing held for pt 05/20/2024. Court ruled pt to be involuntarily committed to any accepting facility.     -Javon Berry,

## 2024-05-20 NOTE — BH NOTE
0856 Patient refused to have vital signs done\" I don't have a heart attack, I am feeling okay\"  1122 Attempted to do vitals again.Patient refused \"I am fine\"

## 2024-05-21 PROCEDURE — 6370000000 HC RX 637 (ALT 250 FOR IP): Performed by: PSYCHIATRY & NEUROLOGY

## 2024-05-21 PROCEDURE — 6370000000 HC RX 637 (ALT 250 FOR IP)

## 2024-05-21 PROCEDURE — 99231 SBSQ HOSP IP/OBS SF/LOW 25: CPT | Performed by: PSYCHIATRY & NEUROLOGY

## 2024-05-21 PROCEDURE — 1240000000 HC EMOTIONAL WELLNESS R&B

## 2024-05-21 RX ADMIN — ACETAMINOPHEN 650 MG: 325 TABLET ORAL at 18:22

## 2024-05-21 RX ADMIN — TRAZODONE HYDROCHLORIDE 50 MG: 50 TABLET ORAL at 20:27

## 2024-05-21 RX ADMIN — CETIRIZINE HYDROCHLORIDE 10 MG: 10 TABLET, FILM COATED ORAL at 10:18

## 2024-05-21 RX ADMIN — OLANZAPINE 10 MG: 10 TABLET, FILM COATED ORAL at 20:27

## 2024-05-21 RX ADMIN — HYDROXYZINE PAMOATE 50 MG: 50 CAPSULE ORAL at 20:27

## 2024-05-21 RX ADMIN — DIVALPROEX SODIUM 500 MG: 500 TABLET, FILM COATED, EXTENDED RELEASE ORAL at 10:18

## 2024-05-21 RX ADMIN — DIVALPROEX SODIUM 500 MG: 500 TABLET, FILM COATED, EXTENDED RELEASE ORAL at 20:27

## 2024-05-21 RX ADMIN — OLANZAPINE 5 MG: 5 TABLET, FILM COATED ORAL at 10:18

## 2024-05-21 RX ADMIN — THERA TABS 1 TABLET: TAB at 10:18

## 2024-05-21 RX ADMIN — HYDROXYZINE PAMOATE 50 MG: 50 CAPSULE ORAL at 14:18

## 2024-05-21 RX ADMIN — BENZTROPINE MESYLATE 1 MG: 1 TABLET ORAL at 10:17

## 2024-05-21 RX ADMIN — HALOPERIDOL 5 MG: 5 TABLET ORAL at 14:18

## 2024-05-21 ASSESSMENT — PAIN DESCRIPTION - ONSET: ONSET: GRADUAL

## 2024-05-21 ASSESSMENT — PAIN DESCRIPTION - LOCATION: LOCATION: GENERALIZED

## 2024-05-21 ASSESSMENT — PAIN DESCRIPTION - FREQUENCY: FREQUENCY: INTERMITTENT

## 2024-05-21 ASSESSMENT — PAIN DESCRIPTION - PAIN TYPE: TYPE: ACUTE PAIN

## 2024-05-21 ASSESSMENT — PAIN - FUNCTIONAL ASSESSMENT: PAIN_FUNCTIONAL_ASSESSMENT: ACTIVITIES ARE NOT PREVENTED

## 2024-05-21 ASSESSMENT — PAIN SCALES - GENERAL
PAINLEVEL_OUTOF10: 6
PAINLEVEL_OUTOF10: 0
PAINLEVEL_OUTOF10: 0

## 2024-05-21 ASSESSMENT — PAIN SCALES - WONG BAKER: WONGBAKER_NUMERICALRESPONSE: NO HURT

## 2024-05-21 ASSESSMENT — PAIN DESCRIPTION - DESCRIPTORS: DESCRIPTORS: ACHING

## 2024-05-21 NOTE — GROUP NOTE
Art Therapy Group Progress Note    PATIENT SCHEDULED FOR GROUP AT: 1:00 PM    GROUP STOP TIME:  1:45 PM     ATTENDANCE: Moderate (7/14 participants)     TOPIC / FOCUS: Create A Vision Board     GOALS:  identify personal goals, creating action plans, encourage goal-oriented thinking, increase self-awareness, provide structure, increase reality orientation, encourage positive coping skills, promote focus, encourage self-expression, promote creativity, increase feelings of autonomy     PARTICIPATION LEVEL:  Pt did not attend.     Mika Bustillos  Art Therapist, MA, ATR-P  Provisional Registered Art Therapist     When this writer encouraged group participating the Pt was sitting in their room having a conversation with internal stimuli.

## 2024-05-21 NOTE — GROUP NOTE
Group Therapy Note    Date: 5/21/2024    Group Start Time: 1510  Group End Time: 1600  Group Topic: Recreational    [facility]    Sterling Paniagua        Group Therapy Note    Attendees: 7/14    Group Type: Game: Family Feud/ Mental Health Family Feud      Group Focus: Recreational therapy group engaged patients through a group game, asking patients a variety of mental health trivia questions. Patient was able to discuss time management, maintaining healthy mental health, and stress. The group can assist in increasing positive mood, reducing stress, and using good social, coping and problem solving skills.     Notes:  Patient did not attend group.    Recreational Therapist  Sterling Paniagua

## 2024-05-21 NOTE — GROUP NOTE
Group Therapy Note    Date: 5/21/2024    Group Start Time: 1410  Group End Time: 1450  Group Topic: Psychotherapy      Liliana Montague        Group Therapy Note    Attendees: 8/13    Group Focus: Group consisted of psychoeducation regarding SMART Goals along with group members writing/identifying short-term and long-term goals, obstacles related to those goals, and how group members can start working towards identified goals. Group members were also given the option to share and discuss their goals.       Notes:  Patient did not attend group.      Discipline Responsible: /Counselor      Signature:  ALEXIS Ward, LPC  Board-Certified Music Therapist  Licensed Professional Counselor

## 2024-05-21 NOTE — GROUP NOTE
Group Therapy Note    Date: 5/21/2024    Group Start Time: 0930  Group End Time: 1030  Group Topic: Music Therapy      Liliana Montague        Group Therapy Note    Attendees: 9/15    Group Focus: Music therapy group explored themes including pursuing goals, determination, and not giving up through roman analysis of two songs previously suggested by group members. The group continued with listening to and discussing patient preferred music. The group may also promote motivation, self-expression, and use of music as a coping skill.       Notes:  Patient did not attend group.      Discipline Responsible: /Counselor      Signature:  ALEXIS Ward, LPC  Board-Certified Music Therapist  Licensed Professional Counselor

## 2024-05-22 PROCEDURE — 6370000000 HC RX 637 (ALT 250 FOR IP)

## 2024-05-22 PROCEDURE — 6370000000 HC RX 637 (ALT 250 FOR IP): Performed by: PSYCHIATRY & NEUROLOGY

## 2024-05-22 PROCEDURE — 99231 SBSQ HOSP IP/OBS SF/LOW 25: CPT | Performed by: PSYCHIATRY & NEUROLOGY

## 2024-05-22 PROCEDURE — 1240000000 HC EMOTIONAL WELLNESS R&B

## 2024-05-22 RX ORDER — TRAZODONE HYDROCHLORIDE 100 MG/1
100 TABLET ORAL NIGHTLY PRN
Status: DISCONTINUED | OUTPATIENT
Start: 2024-05-22 | End: 2024-05-30

## 2024-05-22 RX ADMIN — OLANZAPINE 5 MG: 5 TABLET, FILM COATED ORAL at 10:21

## 2024-05-22 RX ADMIN — CETIRIZINE HYDROCHLORIDE 10 MG: 10 TABLET, FILM COATED ORAL at 10:21

## 2024-05-22 RX ADMIN — THERA TABS 1 TABLET: TAB at 10:21

## 2024-05-22 RX ADMIN — BENZTROPINE MESYLATE 1 MG: 1 TABLET ORAL at 10:21

## 2024-05-22 RX ADMIN — TRAZODONE HYDROCHLORIDE 100 MG: 100 TABLET ORAL at 20:19

## 2024-05-22 RX ADMIN — DIVALPROEX SODIUM 500 MG: 500 TABLET, FILM COATED, EXTENDED RELEASE ORAL at 20:19

## 2024-05-22 RX ADMIN — OLANZAPINE 10 MG: 10 TABLET, FILM COATED ORAL at 20:19

## 2024-05-22 RX ADMIN — HYDROXYZINE PAMOATE 50 MG: 50 CAPSULE ORAL at 20:19

## 2024-05-22 RX ADMIN — DIVALPROEX SODIUM 500 MG: 500 TABLET, FILM COATED, EXTENDED RELEASE ORAL at 10:21

## 2024-05-22 ASSESSMENT — PAIN DESCRIPTION - PAIN TYPE: TYPE: ACUTE PAIN

## 2024-05-22 ASSESSMENT — PAIN - FUNCTIONAL ASSESSMENT: PAIN_FUNCTIONAL_ASSESSMENT: ACTIVITIES ARE NOT PREVENTED

## 2024-05-22 ASSESSMENT — PAIN DESCRIPTION - LOCATION: LOCATION: GENERALIZED

## 2024-05-22 ASSESSMENT — PAIN DESCRIPTION - ONSET: ONSET: GRADUAL

## 2024-05-22 ASSESSMENT — PAIN SCALES - WONG BAKER: WONGBAKER_NUMERICALRESPONSE: HURTS A LITTLE BIT

## 2024-05-22 ASSESSMENT — PAIN DESCRIPTION - DESCRIPTORS: DESCRIPTORS: DULL

## 2024-05-22 ASSESSMENT — PAIN SCALES - GENERAL: PAINLEVEL_OUTOF10: 0

## 2024-05-22 ASSESSMENT — PAIN DESCRIPTION - FREQUENCY: FREQUENCY: INTERMITTENT

## 2024-05-22 NOTE — GROUP NOTE
Art Therapy Group Progress Note    PATIENT SCHEDULED FOR GROUP AT: 1:00 PM    GROUP STOP TIME:  1:45 PM     ATTENDANCE: Moderate (7/15 participants)     TOPIC / FOCUS: Observational drawing      GOALS:  encourage focus, encourage engagement and investment, promote positive coping skills, psychoeducation on distraction coping skills, increase reality orientation, increase problem solving skills, encourage creativity, increase feelings of autonomy    PARTICIPATION LEVEL:  Pt did not attend.     Mika Bustillos  Art Therapist, MA, ATR-P  Provisional Registered Art Therapist

## 2024-05-22 NOTE — GROUP NOTE
Group Therapy Note    Date: 5/22/2024    Group Start Time: 1400  Group End Time: 1445  Group Topic: Music Therapy      Liliana Montague        Group Therapy Note    Attendees: 5/14    Group Focus: Music therapy group consisted of reading the story behind the song “Let It Be” by venu Griffin, an individual songwriting exercise, and verbal processing. Group members were given the ability to identify struggles, supports and coping skills in their lives through their song lyrics.       Notes:  Patient did not attend group.      Discipline Responsible: /Counselor      Signature:  ALEXIS Ward, LPC  Board-Certified Music Therapist  Licensed Professional Counselor

## 2024-05-22 NOTE — GROUP NOTE
Group Therapy Note    Date: 5/22/2024    Group Start Time: 1510  Group End Time: 1550  Group Topic: Recreational    [facility]    Sterling Paniagua        Group Therapy Note    Attendees: 6/14    Group Type: Bingo/ EMOTIONS     Group Focus: Recreational therapy group engaged patients through a game that focused on emotions. RT provided patients with an empty bingo board and a feelings wheel, patients were instructed to fill in their bingo board, using the feelings, with emotions they may have recently felt or feel. While filling in their boards, RT engaged patient in discussion using thoughts and feelings cards. This group may enhance emotional intelligence, social skills, and decrease stress, depression, and anxiety.     Therapist ended group about 10 minutes early, due to a patient (non-group member) becoming upset in group setting causing group member to become unfocused.            Notes:  Patient did not attend group.      Recreational Therapist  Sterling Paniagua

## 2024-05-22 NOTE — BH NOTE
Patient has been mumbling and talking to self, responding to internal stimuli throughout this shift (day-shift). In addition, patient has been exhibiting some confusion, pacing in the hallway and milieu. Patient said verbally to this writer \" In 1982, I woke up and had an AK47 in my hands\". Patient is disorganized and has an untidy appearance. Patient was encouraged by this writer about taking a shower and was educated about good hygiene. Patient did not respond to this writer, instead, patient walked away from this writer and went into room. Patient has been compliant to daytime meds and meals (breakfast and lunch) that were offered. Will continue to monitor the patient's well-being, contact for safety and safety locations.

## 2024-05-23 PROCEDURE — 99231 SBSQ HOSP IP/OBS SF/LOW 25: CPT | Performed by: PSYCHIATRY & NEUROLOGY

## 2024-05-23 PROCEDURE — 6370000000 HC RX 637 (ALT 250 FOR IP): Performed by: PSYCHIATRY & NEUROLOGY

## 2024-05-23 PROCEDURE — 1240000000 HC EMOTIONAL WELLNESS R&B

## 2024-05-23 PROCEDURE — 6370000000 HC RX 637 (ALT 250 FOR IP)

## 2024-05-23 RX ADMIN — CETIRIZINE HYDROCHLORIDE 10 MG: 10 TABLET, FILM COATED ORAL at 08:59

## 2024-05-23 RX ADMIN — HYDROXYZINE PAMOATE 50 MG: 50 CAPSULE ORAL at 15:38

## 2024-05-23 RX ADMIN — BENZTROPINE MESYLATE 1 MG: 1 TABLET ORAL at 08:58

## 2024-05-23 RX ADMIN — THERA TABS 1 TABLET: TAB at 08:59

## 2024-05-23 RX ADMIN — ACETAMINOPHEN 650 MG: 325 TABLET ORAL at 10:50

## 2024-05-23 RX ADMIN — OLANZAPINE 5 MG: 5 TABLET, FILM COATED ORAL at 08:59

## 2024-05-23 ASSESSMENT — PAIN DESCRIPTION - FREQUENCY: FREQUENCY: INTERMITTENT

## 2024-05-23 ASSESSMENT — PAIN SCALES - GENERAL
PAINLEVEL_OUTOF10: 0
PAINLEVEL_OUTOF10: 6
PAINLEVEL_OUTOF10: 0

## 2024-05-23 ASSESSMENT — PAIN DESCRIPTION - LOCATION: LOCATION: GENERALIZED

## 2024-05-23 ASSESSMENT — PAIN DESCRIPTION - ORIENTATION: ORIENTATION: RIGHT

## 2024-05-23 ASSESSMENT — PAIN DESCRIPTION - PAIN TYPE: TYPE: ACUTE PAIN

## 2024-05-23 ASSESSMENT — PAIN DESCRIPTION - DESCRIPTORS: DESCRIPTORS: ACHING

## 2024-05-23 ASSESSMENT — PAIN - FUNCTIONAL ASSESSMENT: PAIN_FUNCTIONAL_ASSESSMENT: ACTIVITIES ARE NOT PREVENTED

## 2024-05-23 ASSESSMENT — PAIN DESCRIPTION - ONSET: ONSET: GRADUAL

## 2024-05-23 NOTE — GROUP NOTE
Group Therapy Note    Date: 5/23/2024    Group Start Time: 0930  Group End Time: 1030  Group Topic: Music Therapy      Liliana Montague        Group Therapy Note    Attendees: 5/15    Group Focus: Music therapy group consisted of a mindfulness-based music listening exercise. Therapist provided psychoeducation on mindfulness and how to utilize music as a form of mindfulness. Group members listened, wrote, and discussed what they noticed in five songs from differing genres. Group members discussed instruments and other musical elements, emotions in the music, thoughts, associations with music, and body sensations. The group may promote self-awareness and use of music and mindfulness as coping skills.       Notes:  Patient sitting in the milieu when asked if he would like to join the music therapy group, and pt declined.      Discipline Responsible: /Counselor      Signature:  ALEXIS Ward, LPC  Board-Certified Music Therapist  Licensed Professional Counselor

## 2024-05-23 NOTE — GROUP NOTE
Group Therapy Note    Date: 5/23/2024    Group Start Time: 1515  Group End Time: 1610  Group Topic: Recreational    [facility]    Sterling Paniagua        Group Therapy Note    Attendees: 3/13      Group Type: Game/Trouble      Group Focus: Recreational therapy group engaged patients through a table game while focusing on the topic goal setting/planning. Group members discussed their goals using SMART (S= specific, M=measurable, A= attainable, R= relevant, T=timely) goal planning. This group may help enhance social skills,  goal organization, and motivation.       Notes:  Patient did not attend group.     Recreational Therapist  Sterling Paniagua

## 2024-05-23 NOTE — BH NOTE
MARY ELLEN Note: The above pt has been responding to internal stimuli the entire shift. He has been displaying rude, inappropriate statement to his peer (female) which resulted inton the above pt begging to be disrespectful towards females while out in the common area. He has stil ocntinue to be very argument, rambling, flight of ideas, and disrespectful towards females. HE has not been aggressive however he does not respond to re-direction he verbalized \"you shut the fuck up bitch\"! He is currently in his room still responding and has been encouraged to perform his adl's despite much encouragement he still decline at this time. The above pt plan of care is on-going no further complaints or concerns.

## 2024-05-23 NOTE — GROUP NOTE
Art Therapy Group Progress Note    PATIENT SCHEDULED FOR GROUP AT: 1:00 PM    GROUP STOP TIME:  1:45 PM     ATTENDANCE: Low (2/13 participants)     TOPIC / FOCUS: Positive Affirmation Coloring Sheet    GOALS: define positive affirmations, identify ways to incorporate affirmations into daily practice, encourage creativity, increase focus, promote social skills/connection, increase positive feelings, increase autonomy, promote positive coping skills     PARTICIPATION LEVEL:  Pt did not attend.     Mika Bustillos  Art Therapist, MA, ATR-P  Provisional Registered Art Therapist     Pt appeared to be responding to internal stimuli.

## 2024-05-24 PROCEDURE — 6370000000 HC RX 637 (ALT 250 FOR IP): Performed by: PSYCHIATRY & NEUROLOGY

## 2024-05-24 PROCEDURE — 1240000000 HC EMOTIONAL WELLNESS R&B

## 2024-05-24 PROCEDURE — 99231 SBSQ HOSP IP/OBS SF/LOW 25: CPT | Performed by: PSYCHIATRY & NEUROLOGY

## 2024-05-24 PROCEDURE — 6370000000 HC RX 637 (ALT 250 FOR IP)

## 2024-05-24 RX ORDER — OLANZAPINE 10 MG/1
10 TABLET ORAL 2 TIMES DAILY
Status: DISCONTINUED | OUTPATIENT
Start: 2024-05-24 | End: 2024-05-28

## 2024-05-24 RX ADMIN — BENZTROPINE MESYLATE 1 MG: 1 TABLET ORAL at 20:30

## 2024-05-24 RX ADMIN — HALOPERIDOL 5 MG: 5 TABLET ORAL at 11:41

## 2024-05-24 RX ADMIN — DIVALPROEX SODIUM 500 MG: 500 TABLET, FILM COATED, EXTENDED RELEASE ORAL at 20:31

## 2024-05-24 RX ADMIN — OLANZAPINE 10 MG: 10 TABLET, FILM COATED ORAL at 20:31

## 2024-05-24 RX ADMIN — HYDROXYZINE PAMOATE 50 MG: 50 CAPSULE ORAL at 20:31

## 2024-05-24 RX ADMIN — OLANZAPINE 5 MG: 5 TABLET, FILM COATED ORAL at 08:12

## 2024-05-24 RX ADMIN — THERA TABS 1 TABLET: TAB at 08:12

## 2024-05-24 RX ADMIN — HYDROXYZINE PAMOATE 50 MG: 50 CAPSULE ORAL at 11:41

## 2024-05-24 RX ADMIN — TRAZODONE HYDROCHLORIDE 100 MG: 100 TABLET ORAL at 20:31

## 2024-05-24 RX ADMIN — HALOPERIDOL 5 MG: 5 TABLET ORAL at 20:31

## 2024-05-24 RX ADMIN — CETIRIZINE HYDROCHLORIDE 10 MG: 10 TABLET, FILM COATED ORAL at 08:12

## 2024-05-24 RX ADMIN — BENZTROPINE MESYLATE 1 MG: 1 TABLET ORAL at 08:11

## 2024-05-24 RX ADMIN — DIVALPROEX SODIUM 500 MG: 500 TABLET, FILM COATED, EXTENDED RELEASE ORAL at 08:11

## 2024-05-24 ASSESSMENT — PAIN SCALES - GENERAL
PAINLEVEL_OUTOF10: 0
PAINLEVEL_OUTOF10: 0

## 2024-05-24 NOTE — BH NOTE
Pt appeared to have slept for 0 hours thus far. He has been up the entire shift, talking to himself/laughing/cursing. He has been overheard cursing in his room, and towards staff x 2. Pt has not been physically aggressive, but he attempted to intimidate staff by jumping up from his bed abruptly, when his door was opened during safety rounds. Will continue to monitor for safety and changes in behavior.

## 2024-05-24 NOTE — GROUP NOTE
Art Therapy Group Progress Note    PATIENT SCHEDULED FOR GROUP AT: 9:30 AM    GROUP STOP TIME:  10:15 AM      ATTENDANCE: Moderate (5/10 participants)     TOPIC / FOCUS: Mindfulness - Motivational Word Bon     GOALS: define mindfulness, identify current feelings, encourage healthy emotional management, practicing positive coping skills, encourage focus, promote self-awareness, reduce stress and anxiety     PARTICIPATION LEVEL:  Pt did not attend.     Mika Bustillos  Art Therapist, MA, ATR-P  Provisional Registered Art Therapist      Pt did not participate in group. Pt was observed in the day area responding to internal stimuli. After group was completed the Pt requested a song. This writer began playing the song, however their were some inappropriate elements so this writer changed the music to instrumental beats. The Pt responded well to the change in music. Pt is malodorous. Will continue to encourage hygiene practices.

## 2024-05-24 NOTE — GROUP NOTE
Group Therapy Note    Date: 5/24/2024    Group Start Time: 1500  Group End Time: 1545  Group Topic: Recreational    [facility]    Sterling Paniagua        Group Therapy Note    Attendees: 3/8    Group Type 52 Essential Coping Skills     Focus: Recreational therapy group engaged patients in exercises focusing on stress, anxiety, building resilience, and daily well-being. Group members discussed and reflected on how to overcome anxiety, procrastination, impulsive behavior, emotional labor, addiction, unproductive habits. The group may enhance socialization, skills and strategies in adaptive coping, emotional resilience, and decrease stress and anxiety.       Notes:  Patient did not attend group    Recreational Therapist  Sterling Paniagua

## 2024-05-25 PROCEDURE — 6370000000 HC RX 637 (ALT 250 FOR IP): Performed by: PSYCHIATRY & NEUROLOGY

## 2024-05-25 PROCEDURE — 6370000000 HC RX 637 (ALT 250 FOR IP)

## 2024-05-25 PROCEDURE — 1240000000 HC EMOTIONAL WELLNESS R&B

## 2024-05-25 PROCEDURE — 99232 SBSQ HOSP IP/OBS MODERATE 35: CPT | Performed by: PSYCHIATRY & NEUROLOGY

## 2024-05-25 RX ADMIN — OLANZAPINE 10 MG: 10 TABLET, FILM COATED ORAL at 13:26

## 2024-05-25 RX ADMIN — OLANZAPINE 10 MG: 10 TABLET, FILM COATED ORAL at 20:40

## 2024-05-25 RX ADMIN — HALOPERIDOL 5 MG: 5 TABLET ORAL at 02:17

## 2024-05-25 RX ADMIN — HYDROXYZINE PAMOATE 50 MG: 50 CAPSULE ORAL at 20:40

## 2024-05-25 RX ADMIN — BENZTROPINE MESYLATE 1 MG: 1 TABLET ORAL at 13:26

## 2024-05-25 RX ADMIN — TRAZODONE HYDROCHLORIDE 100 MG: 100 TABLET ORAL at 20:40

## 2024-05-25 RX ADMIN — HYDROXYZINE PAMOATE 50 MG: 50 CAPSULE ORAL at 02:17

## 2024-05-25 RX ADMIN — THERA TABS 1 TABLET: TAB at 13:26

## 2024-05-25 RX ADMIN — DIVALPROEX SODIUM 500 MG: 500 TABLET, FILM COATED, EXTENDED RELEASE ORAL at 13:26

## 2024-05-25 RX ADMIN — DIVALPROEX SODIUM 500 MG: 500 TABLET, FILM COATED, EXTENDED RELEASE ORAL at 20:40

## 2024-05-25 RX ADMIN — ACETAMINOPHEN 650 MG: 325 TABLET ORAL at 22:55

## 2024-05-25 RX ADMIN — CETIRIZINE HYDROCHLORIDE 10 MG: 10 TABLET, FILM COATED ORAL at 13:26

## 2024-05-25 RX ADMIN — ACETAMINOPHEN 650 MG: 325 TABLET ORAL at 14:13

## 2024-05-25 ASSESSMENT — PAIN SCALES - GENERAL
PAINLEVEL_OUTOF10: 7
PAINLEVEL_OUTOF10: 0

## 2024-05-25 ASSESSMENT — PAIN DESCRIPTION - DESCRIPTORS: DESCRIPTORS: ACHING;DISCOMFORT

## 2024-05-25 ASSESSMENT — PAIN DESCRIPTION - ORIENTATION: ORIENTATION: MID

## 2024-05-25 ASSESSMENT — PAIN DESCRIPTION - LOCATION: LOCATION: BACK

## 2024-05-25 ASSESSMENT — PAIN SCALES - WONG BAKER: WONGBAKER_NUMERICALRESPONSE: NO HURT

## 2024-05-25 NOTE — BH NOTE
Pt has been in and out of his room, slamming the door to his room, talking to his self, and making illogical statements.  Pt has been redirected and asked not to slam his door and to lower the volume of his voice.  Pt continues to slam his door.  Pt was given prn po medication haldol 5 mg po and vistaril 50 mg for psychosis and anxiety.  Will continue to monitor/support

## 2024-05-26 PROCEDURE — 6370000000 HC RX 637 (ALT 250 FOR IP)

## 2024-05-26 PROCEDURE — 1240000000 HC EMOTIONAL WELLNESS R&B

## 2024-05-26 PROCEDURE — 99231 SBSQ HOSP IP/OBS SF/LOW 25: CPT | Performed by: PSYCHIATRY & NEUROLOGY

## 2024-05-26 PROCEDURE — 6370000000 HC RX 637 (ALT 250 FOR IP): Performed by: PSYCHIATRY & NEUROLOGY

## 2024-05-26 PROCEDURE — 6360000002 HC RX W HCPCS: Performed by: PSYCHIATRY & NEUROLOGY

## 2024-05-26 RX ADMIN — OLANZAPINE 10 MG: 10 TABLET, FILM COATED ORAL at 10:45

## 2024-05-26 RX ADMIN — HALOPERIDOL 5 MG: 5 TABLET ORAL at 16:19

## 2024-05-26 RX ADMIN — TRAZODONE HYDROCHLORIDE 100 MG: 100 TABLET ORAL at 20:51

## 2024-05-26 RX ADMIN — DIVALPROEX SODIUM 500 MG: 500 TABLET, FILM COATED, EXTENDED RELEASE ORAL at 10:45

## 2024-05-26 RX ADMIN — BENZTROPINE MESYLATE 1 MG: 1 TABLET ORAL at 10:45

## 2024-05-26 RX ADMIN — BENZTROPINE MESYLATE 1 MG: 1 TABLET ORAL at 15:06

## 2024-05-26 RX ADMIN — BENZTROPINE MESYLATE 1 MG: 1 TABLET ORAL at 20:52

## 2024-05-26 RX ADMIN — THERA TABS 1 TABLET: TAB at 10:45

## 2024-05-26 RX ADMIN — HALOPERIDOL 5 MG: 5 TABLET ORAL at 11:40

## 2024-05-26 RX ADMIN — OLANZAPINE 10 MG: 10 TABLET, FILM COATED ORAL at 20:51

## 2024-05-26 RX ADMIN — HALOPERIDOL 5 MG: 5 TABLET ORAL at 02:40

## 2024-05-26 RX ADMIN — ACETAMINOPHEN 650 MG: 325 TABLET ORAL at 11:55

## 2024-05-26 RX ADMIN — DIVALPROEX SODIUM 500 MG: 500 TABLET, FILM COATED, EXTENDED RELEASE ORAL at 20:51

## 2024-05-26 RX ADMIN — BENZTROPINE MESYLATE 1 MG: 1 TABLET ORAL at 02:40

## 2024-05-26 RX ADMIN — HYDROXYZINE PAMOATE 50 MG: 50 CAPSULE ORAL at 16:19

## 2024-05-26 RX ADMIN — CETIRIZINE HYDROCHLORIDE 10 MG: 10 TABLET, FILM COATED ORAL at 10:45

## 2024-05-26 RX ADMIN — HYDROXYZINE PAMOATE 50 MG: 50 CAPSULE ORAL at 11:40

## 2024-05-26 RX ADMIN — ACETAMINOPHEN 650 MG: 325 TABLET ORAL at 16:34

## 2024-05-26 ASSESSMENT — PAIN DESCRIPTION - ORIENTATION
ORIENTATION: LOWER
ORIENTATION: LOWER

## 2024-05-26 ASSESSMENT — PAIN SCALES - WONG BAKER
WONGBAKER_NUMERICALRESPONSE: NO HURT
WONGBAKER_NUMERICALRESPONSE: NO HURT
WONGBAKER_NUMERICALRESPONSE: HURTS LITTLE MORE
WONGBAKER_NUMERICALRESPONSE: NO HURT
WONGBAKER_NUMERICALRESPONSE: NO HURT
WONGBAKER_NUMERICALRESPONSE: HURTS LITTLE MORE

## 2024-05-26 ASSESSMENT — PAIN - FUNCTIONAL ASSESSMENT
PAIN_FUNCTIONAL_ASSESSMENT: ACTIVITIES ARE NOT PREVENTED
PAIN_FUNCTIONAL_ASSESSMENT: ACTIVITIES ARE NOT PREVENTED

## 2024-05-26 ASSESSMENT — PAIN SCALES - GENERAL
PAINLEVEL_OUTOF10: 0
PAINLEVEL_OUTOF10: 0
PAINLEVEL_OUTOF10: 5
PAINLEVEL_OUTOF10: 5
PAINLEVEL_OUTOF10: 0
PAINLEVEL_OUTOF10: 0

## 2024-05-26 ASSESSMENT — PAIN DESCRIPTION - DESCRIPTORS
DESCRIPTORS: ACHING
DESCRIPTORS: ACHING

## 2024-05-26 ASSESSMENT — PAIN DESCRIPTION - LOCATION
LOCATION: BACK
LOCATION: BACK

## 2024-05-26 ASSESSMENT — PAIN DESCRIPTION - ONSET
ONSET: GRADUAL
ONSET: GRADUAL

## 2024-05-26 ASSESSMENT — PAIN DESCRIPTION - FREQUENCY
FREQUENCY: INTERMITTENT
FREQUENCY: INTERMITTENT

## 2024-05-26 ASSESSMENT — PAIN DESCRIPTION - DIRECTION
RADIATING_TOWARDS: NON-RADIATING
RADIATING_TOWARDS: NON-RADIATING

## 2024-05-26 ASSESSMENT — PAIN DESCRIPTION - PAIN TYPE
TYPE: ACUTE PAIN
TYPE: ACUTE PAIN

## 2024-05-26 NOTE — BH NOTE
2045- pt has been isolated to self in room/responding to internal stimuli.  Pt non-compliant with meds stating, \"Get the fuck out of here.  I'll take them after I get some sleep.\"  Pt was educated on meds being provided and attempts were unsuccessful.  Will continue to monitor and support as needed.    0247- pt has been responding to internal stimuli and came charging out of room yelling.  Pt was redirected for impulsive behavior.  Pt came back out of room attempting to argue with staff about current location, \"this isn't a psych herring.  This is a hospital!\"  Pt was informed that due to his behavior, he would be getting IM medication to decrease agitation.  Pt became calmer and was able to reason and requested po meds instead.  Pt was encouraged to keep oriented to reality and time.  Po meds provided per pt request.  Will continue to monitor and support as needed.

## 2024-05-27 PROCEDURE — 6370000000 HC RX 637 (ALT 250 FOR IP): Performed by: PSYCHIATRY & NEUROLOGY

## 2024-05-27 PROCEDURE — 99231 SBSQ HOSP IP/OBS SF/LOW 25: CPT | Performed by: PSYCHIATRY & NEUROLOGY

## 2024-05-27 PROCEDURE — 6370000000 HC RX 637 (ALT 250 FOR IP)

## 2024-05-27 PROCEDURE — 1240000000 HC EMOTIONAL WELLNESS R&B

## 2024-05-27 RX ORDER — DIVALPROEX SODIUM 250 MG/1
500 TABLET, DELAYED RELEASE ORAL 2 TIMES DAILY
Status: DISCONTINUED | OUTPATIENT
Start: 2024-05-27 | End: 2024-05-28

## 2024-05-27 RX ORDER — OLANZAPINE 5 MG/1
5 TABLET ORAL ONCE
Status: COMPLETED | OUTPATIENT
Start: 2024-05-27 | End: 2024-05-27

## 2024-05-27 RX ORDER — LORAZEPAM 1 MG/1
1 TABLET ORAL ONCE
Status: COMPLETED | OUTPATIENT
Start: 2024-05-27 | End: 2024-05-27

## 2024-05-27 RX ADMIN — LORAZEPAM 1 MG: 1 TABLET ORAL at 14:12

## 2024-05-27 RX ADMIN — HYDROXYZINE PAMOATE 50 MG: 50 CAPSULE ORAL at 09:09

## 2024-05-27 RX ADMIN — HALOPERIDOL 5 MG: 5 TABLET ORAL at 12:24

## 2024-05-27 RX ADMIN — OLANZAPINE 5 MG: 5 TABLET, FILM COATED ORAL at 14:12

## 2024-05-27 RX ADMIN — ACETAMINOPHEN 650 MG: 325 TABLET ORAL at 19:31

## 2024-05-27 RX ADMIN — OLANZAPINE 10 MG: 10 TABLET, FILM COATED ORAL at 19:31

## 2024-05-27 RX ADMIN — DIVALPROEX SODIUM 500 MG: 500 TABLET, FILM COATED, EXTENDED RELEASE ORAL at 09:09

## 2024-05-27 RX ADMIN — CETIRIZINE HYDROCHLORIDE 10 MG: 10 TABLET, FILM COATED ORAL at 09:09

## 2024-05-27 RX ADMIN — BENZTROPINE MESYLATE 1 MG: 1 TABLET ORAL at 09:09

## 2024-05-27 RX ADMIN — DIVALPROEX SODIUM 500 MG: 250 TABLET, DELAYED RELEASE ORAL at 21:00

## 2024-05-27 RX ADMIN — OLANZAPINE 10 MG: 10 TABLET, FILM COATED ORAL at 09:09

## 2024-05-27 RX ADMIN — THERA TABS 1 TABLET: TAB at 09:09

## 2024-05-27 RX ADMIN — ACETAMINOPHEN 650 MG: 325 TABLET ORAL at 11:08

## 2024-05-27 ASSESSMENT — PAIN DESCRIPTION - DIRECTION
RADIATING_TOWARDS: NON-RADIATING
RADIATING_TOWARDS: NON-RADIATING

## 2024-05-27 ASSESSMENT — PAIN DESCRIPTION - DESCRIPTORS
DESCRIPTORS: ACHING
DESCRIPTORS: ACHING

## 2024-05-27 ASSESSMENT — PAIN SCALES - GENERAL
PAINLEVEL_OUTOF10: 0
PAINLEVEL_OUTOF10: 2
PAINLEVEL_OUTOF10: 0

## 2024-05-27 ASSESSMENT — PAIN DESCRIPTION - ONSET
ONSET: GRADUAL
ONSET: GRADUAL

## 2024-05-27 ASSESSMENT — PAIN DESCRIPTION - FREQUENCY
FREQUENCY: INTERMITTENT
FREQUENCY: INTERMITTENT

## 2024-05-27 ASSESSMENT — PAIN SCALES - WONG BAKER
WONGBAKER_NUMERICALRESPONSE: NO HURT
WONGBAKER_NUMERICALRESPONSE: HURTS A LITTLE BIT

## 2024-05-27 ASSESSMENT — PAIN DESCRIPTION - LOCATION
LOCATION: HEAD
LOCATION: SHOULDER

## 2024-05-27 ASSESSMENT — PAIN DESCRIPTION - PAIN TYPE
TYPE: ACUTE PAIN
TYPE: ACUTE PAIN

## 2024-05-27 ASSESSMENT — PAIN DESCRIPTION - ORIENTATION
ORIENTATION: RIGHT
ORIENTATION: LEFT

## 2024-05-28 PROCEDURE — 6370000000 HC RX 637 (ALT 250 FOR IP): Performed by: PSYCHIATRY & NEUROLOGY

## 2024-05-28 PROCEDURE — 6370000000 HC RX 637 (ALT 250 FOR IP)

## 2024-05-28 PROCEDURE — 1240000000 HC EMOTIONAL WELLNESS R&B

## 2024-05-28 PROCEDURE — 99231 SBSQ HOSP IP/OBS SF/LOW 25: CPT | Performed by: PSYCHIATRY & NEUROLOGY

## 2024-05-28 RX ORDER — DIVALPROEX SODIUM 250 MG/1
750 TABLET, DELAYED RELEASE ORAL 2 TIMES DAILY
Status: DISCONTINUED | OUTPATIENT
Start: 2024-05-28 | End: 2024-06-04 | Stop reason: HOSPADM

## 2024-05-28 RX ORDER — LORAZEPAM 1 MG/1
1 TABLET ORAL EVERY 6 HOURS PRN
Status: DISCONTINUED | OUTPATIENT
Start: 2024-05-28 | End: 2024-06-04 | Stop reason: HOSPADM

## 2024-05-28 RX ORDER — OLANZAPINE 5 MG/1
10 TABLET, ORALLY DISINTEGRATING ORAL DAILY
Status: DISCONTINUED | OUTPATIENT
Start: 2024-05-29 | End: 2024-06-04 | Stop reason: HOSPADM

## 2024-05-28 RX ORDER — OLANZAPINE 5 MG/1
15 TABLET, ORALLY DISINTEGRATING ORAL NIGHTLY
Status: DISCONTINUED | OUTPATIENT
Start: 2024-05-28 | End: 2024-06-04 | Stop reason: HOSPADM

## 2024-05-28 RX ORDER — LORAZEPAM 1 MG/1
1 TABLET ORAL EVERY 6 HOURS PRN
Status: DISCONTINUED | OUTPATIENT
Start: 2024-05-28 | End: 2024-05-28 | Stop reason: SDUPTHER

## 2024-05-28 RX ADMIN — DIVALPROEX SODIUM 750 MG: 250 TABLET, DELAYED RELEASE ORAL at 21:00

## 2024-05-28 RX ADMIN — ACETAMINOPHEN 650 MG: 325 TABLET ORAL at 14:48

## 2024-05-28 RX ADMIN — HALOPERIDOL 5 MG: 5 TABLET ORAL at 10:26

## 2024-05-28 RX ADMIN — HYDROXYZINE PAMOATE 50 MG: 50 CAPSULE ORAL at 10:26

## 2024-05-28 RX ADMIN — BENZTROPINE MESYLATE 1 MG: 1 TABLET ORAL at 08:21

## 2024-05-28 RX ADMIN — THERA TABS 1 TABLET: TAB at 08:21

## 2024-05-28 RX ADMIN — TRAZODONE HYDROCHLORIDE 100 MG: 100 TABLET ORAL at 20:09

## 2024-05-28 RX ADMIN — DIVALPROEX SODIUM 500 MG: 250 TABLET, DELAYED RELEASE ORAL at 08:21

## 2024-05-28 RX ADMIN — ACETAMINOPHEN 650 MG: 325 TABLET ORAL at 20:17

## 2024-05-28 RX ADMIN — OLANZAPINE 10 MG: 10 TABLET, FILM COATED ORAL at 08:21

## 2024-05-28 RX ADMIN — CETIRIZINE HYDROCHLORIDE 10 MG: 10 TABLET, FILM COATED ORAL at 08:21

## 2024-05-28 RX ADMIN — LORAZEPAM 1 MG: 1 TABLET ORAL at 20:17

## 2024-05-28 RX ADMIN — OLANZAPINE 15 MG: 5 TABLET, ORALLY DISINTEGRATING ORAL at 20:09

## 2024-05-28 RX ADMIN — HALOPERIDOL 5 MG: 5 TABLET ORAL at 17:17

## 2024-05-28 RX ADMIN — HYDROXYZINE PAMOATE 50 MG: 50 CAPSULE ORAL at 17:17

## 2024-05-28 RX ADMIN — ACETAMINOPHEN 650 MG: 325 TABLET ORAL at 10:01

## 2024-05-28 ASSESSMENT — PAIN SCALES - WONG BAKER
WONGBAKER_NUMERICALRESPONSE: NO HURT
WONGBAKER_NUMERICALRESPONSE: HURTS A LITTLE BIT
WONGBAKER_NUMERICALRESPONSE: NO HURT
WONGBAKER_NUMERICALRESPONSE: HURTS LITTLE MORE
WONGBAKER_NUMERICALRESPONSE: NO HURT
WONGBAKER_NUMERICALRESPONSE: NO HURT

## 2024-05-28 ASSESSMENT — PAIN DESCRIPTION - ONSET
ONSET: GRADUAL
ONSET: GRADUAL

## 2024-05-28 ASSESSMENT — PAIN DESCRIPTION - ORIENTATION
ORIENTATION: OTHER (COMMENT)
ORIENTATION: OTHER (COMMENT)

## 2024-05-28 ASSESSMENT — PAIN DESCRIPTION - LOCATION
LOCATION: HEAD
LOCATION: GENERALIZED
LOCATION: GENERALIZED

## 2024-05-28 ASSESSMENT — PAIN SCALES - GENERAL
PAINLEVEL_OUTOF10: 3
PAINLEVEL_OUTOF10: 0
PAINLEVEL_OUTOF10: 5
PAINLEVEL_OUTOF10: 0
PAINLEVEL_OUTOF10: 6
PAINLEVEL_OUTOF10: 0

## 2024-05-28 ASSESSMENT — PAIN DESCRIPTION - DIRECTION
RADIATING_TOWARDS: NON-RADIATING
RADIATING_TOWARDS: NON-RADIATING

## 2024-05-28 ASSESSMENT — PAIN DESCRIPTION - PAIN TYPE
TYPE: ACUTE PAIN
TYPE: ACUTE PAIN

## 2024-05-28 ASSESSMENT — PAIN DESCRIPTION - FREQUENCY
FREQUENCY: INTERMITTENT
FREQUENCY: INTERMITTENT

## 2024-05-28 ASSESSMENT — PAIN DESCRIPTION - DESCRIPTORS
DESCRIPTORS: ACHING
DESCRIPTORS: ACHING

## 2024-05-28 NOTE — GROUP NOTE
Art Therapy Group Progress Note    PATIENT SCHEDULED FOR GROUP AT: 1:00 PM    GROUP STOP TIME:  1:45 PM     ATTENDANCE: Moderate (7/11 participants)     TOPIC / FOCUS: Create an Image of a Healing Space    GOALS:  define healing spaces, explore how healing is experienced mentally and physically, encourage focus, promote creativity, increase self-awareness, identify ways to support health, and evoke cohesion of mind, body, and spirit     PARTICIPATION LEVEL:  Pt did not attend.     Mika Bustillos  Art Therapist, MA, ATR-P  Provisional Registered Art Therapist     Pt responding to internal stimuli in room.

## 2024-05-28 NOTE — BH NOTE
Patient appeared to have slept 7.5 hrs. Patient woke once , explaining to writer that he no longer practices Yarsani. Patient observed responding but in a low tone without any aggressive gestures. Will continue to monitor for safety and changes in behavior.

## 2024-05-28 NOTE — GROUP NOTE
Group Therapy Note    Date: 5/28/2024    Group Start Time: 0930  Group End Time: 1020  Group Topic: Music Therapy      Liliana Montague        Group Therapy Note    Attendees: 5/10    Group Focus: Music therapy group consisted of songwriting and/or poem writing utilizing the technique of song collage. Group members selected lyrics and words from existing songs to create their own songs, raps, or poetry. Group members worked individually and then were given the opportunity to share. The group may promote creativity and use of music, writing, and the creative arts as outlets for self-expression.        Notes:  Patient did not attend group.      Discipline Responsible: /Counselor      Signature:  ALEXIS Ward, LPC  Board-Certified Music Therapist  Licensed Professional Counselor

## 2024-05-28 NOTE — GROUP NOTE
Group Therapy Note    Date: 5/28/2024    Group Start Time: 1400  Group End Time: 1445  Group Topic: Psychoeducation      Eryn Perez        Group Therapy Note    Attendees: 4  Group Topic- To discuss self care, create a self care plan, and to find new ways to implement self care into one's schedule.        Patient declined to participate.       Discipline Responsible: /Counselor      Signature:  Eryn Perez

## 2024-05-28 NOTE — GROUP NOTE
Group Therapy Note    Date: 5/28/2024    Group Start Time: 1500  Group End Time: 1545  Group Topic: Recreational    [facility]    Sterling Paniagua        Group Therapy Note    Attendees: 6/11    Group Type: Coping Skills Dianelys   Group Focus: Recreational therapy group engaged patients through a game that focused on coping skills. PTs discussed the importance of coping mechanisms and what coping strategies they use. This group may help enhance your social skills, coping techniques, and decrease stress, depression, and anxiety.        Notes: Patient did not attend group    Recreational Therapist  Sterling Paniagua

## 2024-05-29 PROCEDURE — 6370000000 HC RX 637 (ALT 250 FOR IP): Performed by: PSYCHIATRY & NEUROLOGY

## 2024-05-29 PROCEDURE — 1240000000 HC EMOTIONAL WELLNESS R&B

## 2024-05-29 PROCEDURE — 99231 SBSQ HOSP IP/OBS SF/LOW 25: CPT | Performed by: PSYCHIATRY & NEUROLOGY

## 2024-05-29 PROCEDURE — 6370000000 HC RX 637 (ALT 250 FOR IP)

## 2024-05-29 RX ADMIN — CETIRIZINE HYDROCHLORIDE 10 MG: 10 TABLET, FILM COATED ORAL at 07:27

## 2024-05-29 RX ADMIN — BENZTROPINE MESYLATE 1 MG: 1 TABLET ORAL at 07:27

## 2024-05-29 RX ADMIN — DIVALPROEX SODIUM 750 MG: 250 TABLET, DELAYED RELEASE ORAL at 07:28

## 2024-05-29 RX ADMIN — THERA TABS 1 TABLET: TAB at 07:27

## 2024-05-29 RX ADMIN — LORAZEPAM 1 MG: 1 TABLET ORAL at 14:13

## 2024-05-29 RX ADMIN — HALOPERIDOL 5 MG: 5 TABLET ORAL at 14:13

## 2024-05-29 RX ADMIN — OLANZAPINE 15 MG: 5 TABLET, ORALLY DISINTEGRATING ORAL at 19:55

## 2024-05-29 RX ADMIN — DIVALPROEX SODIUM 750 MG: 250 TABLET, DELAYED RELEASE ORAL at 19:54

## 2024-05-29 RX ADMIN — OLANZAPINE 10 MG: 5 TABLET, ORALLY DISINTEGRATING ORAL at 07:28

## 2024-05-29 RX ADMIN — HYDROXYZINE PAMOATE 50 MG: 50 CAPSULE ORAL at 17:46

## 2024-05-29 RX ADMIN — TRAZODONE HYDROCHLORIDE 100 MG: 100 TABLET ORAL at 19:54

## 2024-05-29 ASSESSMENT — PAIN SCALES - GENERAL
PAINLEVEL_OUTOF10: 0
PAINLEVEL_OUTOF10: 0

## 2024-05-29 ASSESSMENT — PAIN SCALES - WONG BAKER: WONGBAKER_NUMERICALRESPONSE: NO HURT

## 2024-05-29 NOTE — H&P
Pt. Is  a33 year old male with a history of Schizoaffective bipolar type. .       Pt  has been anxious and observed talking to self . Pt has been refusing vitals. Covering SW encouraged to take medications.  Pt  to take medications with some hesitation The staff encouraged pt to take a bathe because he is malodorous. covering SW encouraged pt to take bath. I did  already. I do not want to put those scrubs on . Pt is labile and has been responding to internal stimuli. Pt has poor insight. SW will engage pt with reality orientation and provided supports towards dc planning. .    Barbara Mason -BCP MA, LMHP-R  
measures (select all that apply):  []  Successful past responses to stress  []  Spiritual/Yazidi beliefs  []  Capacity for reality testing  []  Positive therapeutic relationships  []  Social supports/connections  []  Positive coping skills  []  Frustration tolerance/optimism  []  Children or pets in the home  []  Sense of responsibility to family  [x]  Agrees to treatment plan and follow up    High Risk Diagnoses (select all that apply):  []  Depression/Bipolar Disorder  []  Dual Diagnosis  []  Cardiovascular Disease  [x]  Schizophrenia  []  Chronic Pain  []  Epilepsy  []  Cancer  []  Personality Disorder  []  HIV/AIDS  []  Multiple Sclerosis    Dangerousness Assessment (Suicide, homicide, property destruction...)    Risk Factors reviewed and risk assessed to be:  [] low  [] low-moderate  [] moderate   [x] moderate-high  [] high     Protection factors reviewed and risk assessed to be:  [] low  [] low-moderate  [] moderate   [x] moderate-high  [] high     Response to treatment and risk assessed to be:  [] low  [] low-moderate  [] moderate   [] moderate-high  [] high     Support reviewed and risk assessed to be:  [] low  [] low-moderate  [] moderate   [x] moderate-high  [] high     Acceptance of Discharge and outpatient treatment reviewed and risk assessed to be:    [] low  [] low-moderate  [] moderate   [x] moderate-high  [] high   Overall risk assessed to be:  [] low  [] low-moderate  [] moderate   [x] moderate-high  [] high       Assessment and Plan     Psychiatric Diagnoses:   Patient Active Problem List   Diagnosis    Tobacco abuse    Schizoaffective disorder (HCC)    Cannabis abuse, continuous    Schizoaffective disorder, bipolar type (HCC)       Medical Diagnoses: As noted above    Psychosocial and contextual factors: Poor supports     Level of impairment/disability: Significant     Teddy Moore is a 33 y.o. who is currently requiring acute stabilization     Admit to locked

## 2024-05-29 NOTE — GROUP NOTE
Group Therapy Note    Date: 5/29/2024    Group Start Time: 1500  Group End Time: 1545  Group Topic: Recreational    [facility]    Sterling Paniagua        Group Therapy Note    Attendees: 7/12    Group Type: Jeopardy    Group Focus: Recreational therapy group engaged patients through a group game.  RT used topics that focused on self-care, coping skills, identify emotions and communication terms. The group can assist in increasing mood, coping mechanisms, self-care, social and problem-solving skills, and reduce stress.        Notes:  Patient did not attend group. Patient pacing unit responding to internal stimuli.     Recreational Therapist  Sterling Paniagua

## 2024-05-29 NOTE — GROUP NOTE
Group Therapy Note    Date: 5/29/2024    Group Start Time: 1400  Group End Time: 1450  Group Topic: Music Therapy      Liliana Montague        Group Therapy Note    Attendees: 7/13    Group Focus: Music therapy group consisted of collaborative music making incorporating both musical improvisation and group singing with instruments. The group may promote self-expression, group cohesion, improve mood, improve energy levels, and support present-centered focus. The group may also promote use of music as a coping skill.         Notes:  Patient did not attend group. Pt paced in and out of the group area while verbally responding to internal stimuli. Pt declined lyrics when offered.      Discipline Responsible: /Counselor      Signature:  ALEXIS Ward, LPC  Board-Certified Music Therapist  Licensed Professional Counselor

## 2024-05-29 NOTE — GROUP NOTE
Art Therapy Group Progress Note    PATIENT SCHEDULED FOR GROUP AT: 1:00 PM    GROUP STOP TIME:  1:45 PM     ATTENDANCE: Moderate (6/13 participants)     TOPIC / FOCUS:  Landscape Paper collage     GOALS:  improve reality orientation, encourage focus, promote creativity, increase feelings of autonomy, encourage positive coping skills, reduce feelings of anxiety     PARTICIPATION LEVEL:  Pt did not attend.     Mika Bustillos  Art Therapist, MA, ATR-P  Provisional Registered Art Therapist

## 2024-05-30 PROCEDURE — 99231 SBSQ HOSP IP/OBS SF/LOW 25: CPT | Performed by: PSYCHIATRY & NEUROLOGY

## 2024-05-30 PROCEDURE — 6370000000 HC RX 637 (ALT 250 FOR IP)

## 2024-05-30 PROCEDURE — 6370000000 HC RX 637 (ALT 250 FOR IP): Performed by: PSYCHIATRY & NEUROLOGY

## 2024-05-30 PROCEDURE — 1240000000 HC EMOTIONAL WELLNESS R&B

## 2024-05-30 RX ADMIN — DIVALPROEX SODIUM 750 MG: 250 TABLET, DELAYED RELEASE ORAL at 08:03

## 2024-05-30 RX ADMIN — THERA TABS 1 TABLET: TAB at 08:04

## 2024-05-30 RX ADMIN — ACETAMINOPHEN 650 MG: 325 TABLET ORAL at 11:00

## 2024-05-30 RX ADMIN — CETIRIZINE HYDROCHLORIDE 10 MG: 10 TABLET, FILM COATED ORAL at 08:04

## 2024-05-30 RX ADMIN — OLANZAPINE 10 MG: 5 TABLET, ORALLY DISINTEGRATING ORAL at 08:03

## 2024-05-30 RX ADMIN — LORAZEPAM 1 MG: 1 TABLET ORAL at 14:10

## 2024-05-30 RX ADMIN — BENZTROPINE MESYLATE 1 MG: 1 TABLET ORAL at 08:04

## 2024-05-30 RX ADMIN — TRAZODONE HYDROCHLORIDE 150 MG: 50 TABLET ORAL at 23:19

## 2024-05-30 RX ADMIN — HALOPERIDOL 5 MG: 5 TABLET ORAL at 14:10

## 2024-05-30 RX ADMIN — OLANZAPINE 15 MG: 5 TABLET, ORALLY DISINTEGRATING ORAL at 23:20

## 2024-05-30 RX ADMIN — ACETAMINOPHEN 650 MG: 325 TABLET ORAL at 19:39

## 2024-05-30 ASSESSMENT — PAIN DESCRIPTION - DESCRIPTORS
DESCRIPTORS: ACHING;DISCOMFORT
DESCRIPTORS: ACHING

## 2024-05-30 ASSESSMENT — PAIN SCALES - GENERAL
PAINLEVEL_OUTOF10: 0
PAINLEVEL_OUTOF10: 5
PAINLEVEL_OUTOF10: 0
PAINLEVEL_OUTOF10: 5

## 2024-05-30 ASSESSMENT — PAIN DESCRIPTION - ONSET: ONSET: SUDDEN

## 2024-05-30 ASSESSMENT — PAIN DESCRIPTION - LOCATION
LOCATION: HEAD
LOCATION: HEAD

## 2024-05-30 ASSESSMENT — PAIN DESCRIPTION - ORIENTATION: ORIENTATION: ANTERIOR

## 2024-05-30 ASSESSMENT — PAIN DESCRIPTION - FREQUENCY: FREQUENCY: INTERMITTENT

## 2024-05-30 ASSESSMENT — PAIN DESCRIPTION - PAIN TYPE: TYPE: ACUTE PAIN

## 2024-05-30 NOTE — GROUP NOTE
Group Therapy Note    Date: 5/30/2024    Group Start Time: 0930  Group End Time: 1035  Group Topic: Music Therapy      Liliana Montague        Group Therapy Note    Attendees: 7/10    Group Focus: Music therapy group explored themes related to depression and resilience through roman analysis and listening to and discussing patient preferred music. The group discussed topics including warning signs, hitting rock bottom, reasons to keep going, psychiatric medication, and coping skills. Therapist also discussed resiliency utilizing metaphor of chloe pang and group members suggested music relating to the theme.       Notes:  Pt did not attend group. Pt walked through the group area verbally responding to internal stimuli.      Discipline Responsible: /Counselor      Signature:  ALEXIS Ward, LPC  Board-Certified Music Therapist  Licensed Professional Counselor

## 2024-05-30 NOTE — BH NOTE
Pt appeared to have slept for 4.50 hours thus far. He continues to respond to internal stimuli, but has not exhibited aggressive behavior. Will continue to monitor for safety and changes in behavior.

## 2024-05-30 NOTE — GROUP NOTE
Group Therapy Note    Date: 5/30/2024    Group Start Time: 1310  Group End Time: 1412  Group Topic: Recreational    [facility]    Sterling Paniagua        Group Therapy Note    Attendees: 7/14  Group type: Stretching     Group Focus: This recreational group engaged patients in physical activity. Recreational therapists lead group members in guided stretches. After stretches, patients used thought and feelings cards to identify, process, and work through various issues, including changes within the family, trauma, grief, anger, depression, anxiety and fears. This group may help reduce feelings of depression and stress and enhance mood and over emotional well-being.        Notes:  Patient did not attend group    Recreational Therapist  Sterling Paniagua

## 2024-05-31 PROCEDURE — 6370000000 HC RX 637 (ALT 250 FOR IP): Performed by: PSYCHIATRY & NEUROLOGY

## 2024-05-31 PROCEDURE — 6370000000 HC RX 637 (ALT 250 FOR IP)

## 2024-05-31 PROCEDURE — 1240000000 HC EMOTIONAL WELLNESS R&B

## 2024-05-31 PROCEDURE — 99231 SBSQ HOSP IP/OBS SF/LOW 25: CPT | Performed by: PSYCHIATRY & NEUROLOGY

## 2024-05-31 RX ADMIN — ACETAMINOPHEN 650 MG: 325 TABLET ORAL at 08:08

## 2024-05-31 RX ADMIN — CETIRIZINE HYDROCHLORIDE 10 MG: 10 TABLET, FILM COATED ORAL at 07:41

## 2024-05-31 RX ADMIN — ACETAMINOPHEN 650 MG: 325 TABLET ORAL at 13:36

## 2024-05-31 RX ADMIN — HYDROXYZINE PAMOATE 50 MG: 50 CAPSULE ORAL at 13:40

## 2024-05-31 RX ADMIN — DIVALPROEX SODIUM 750 MG: 250 TABLET, DELAYED RELEASE ORAL at 22:42

## 2024-05-31 RX ADMIN — TRAZODONE HYDROCHLORIDE 150 MG: 50 TABLET ORAL at 22:42

## 2024-05-31 RX ADMIN — ACETAMINOPHEN 650 MG: 325 TABLET ORAL at 19:41

## 2024-05-31 RX ADMIN — THERA TABS 1 TABLET: TAB at 07:41

## 2024-05-31 RX ADMIN — OLANZAPINE 15 MG: 5 TABLET, ORALLY DISINTEGRATING ORAL at 19:41

## 2024-05-31 RX ADMIN — OLANZAPINE 10 MG: 5 TABLET, ORALLY DISINTEGRATING ORAL at 07:42

## 2024-05-31 RX ADMIN — DIVALPROEX SODIUM 750 MG: 250 TABLET, DELAYED RELEASE ORAL at 07:41

## 2024-05-31 RX ADMIN — BENZTROPINE MESYLATE 1 MG: 1 TABLET ORAL at 07:41

## 2024-05-31 ASSESSMENT — PAIN DESCRIPTION - LOCATION
LOCATION: HEAD

## 2024-05-31 ASSESSMENT — PAIN - FUNCTIONAL ASSESSMENT
PAIN_FUNCTIONAL_ASSESSMENT: ACTIVITIES ARE NOT PREVENTED

## 2024-05-31 ASSESSMENT — PAIN DESCRIPTION - DESCRIPTORS
DESCRIPTORS: ACHING

## 2024-05-31 ASSESSMENT — PAIN DESCRIPTION - PAIN TYPE: TYPE: ACUTE PAIN

## 2024-05-31 ASSESSMENT — PAIN SCALES - GENERAL
PAINLEVEL_OUTOF10: 5
PAINLEVEL_OUTOF10: 0
PAINLEVEL_OUTOF10: 5
PAINLEVEL_OUTOF10: 5

## 2024-05-31 ASSESSMENT — PAIN DESCRIPTION - ONSET: ONSET: PROGRESSIVE

## 2024-05-31 ASSESSMENT — PAIN DESCRIPTION - FREQUENCY: FREQUENCY: CONTINUOUS

## 2024-05-31 NOTE — BH NOTE
Pt refused to get vital signs taken this morning stating \"No I'm good.\" Pt expresses no other concerns at this time.

## 2024-05-31 NOTE — GROUP NOTE
Group Therapy Note    Date: 5/31/2024    Group Start Time: 1300  Group End Time: 1355  Group Topic: Music Therapy      Liliana Montague        Group Therapy Note    Attendees: 4/8    Group Focus: Music therapy group consisted of group members identifying and sharing songs in various categories related to their lives and in coping. Categories included identifying songs that remind them of someone important or an important event, songs that inspire them, songs that help them feel understood, songs to help calm down, and songs or lyrics to help accept oneself or their situation. Group members shared songs from their lists, and were guided through mindfulness prompts during an instrumental song shared by a group member.       Notes:  Pt intermittently paced in and out of the group area responding to internal stimuli and sat in the group area for about 5 minutes at the start of group and at the end of group. During initial introductions, when this therapist called patient Teddy, pt stated this was not his name and then spelled his name, seeming bizarre. Pt became agitated while discussing how he is not Teddy and questioned people in the group area about this. Pt was interruptive at times, talking in a loud manner while responding to internal stimuli.    Status After Intervention:  Unchanged    Participation Level: None    Participation Quality: Inappropriate, Intrusive, and Resistant      Speech:  loud      Thought Process/Content: Disorganized      Affective Functioning: Congruent      Mood: irritable, agitated      Level of consciousness:  Preoccupied      Response to Learning: Resistant      Endings: None Reported    Modes of Intervention: Support, Socialization, Exploration, and Media      Discipline Responsible: /Counselor      Signature:  ALEXIS Ward, LPC  Board-Certified Music Therapist  Licensed Professional

## 2024-05-31 NOTE — BH NOTE
0646- pt has been resting.  Pt refused blood draw.  Pt was educated and encouraged to complete lab work.  Pt adamantly refused.  Will continue to monitor and support as needed.

## 2024-05-31 NOTE — GROUP NOTE
Group Therapy Note    Date: 5/31/2024    Group Start Time: 1500  Group End Time: 1555  Group Topic: Music Therapy    Liliana Montague        Group Therapy Note    Attendees: 3/9    Group Focus: Music therapy group consisted of collaborative music making incorporating both musical improvisation and group singing with instruments. The group may promote self-expression, group cohesion, improve mood, improve energy levels, and support present-centered focus. The group may also promote use of music as a coping skill.       Notes:  Pt sat in the group area for portions of group. Pt was verbally responding to internal stimuli while in the group area and did not engage in the group.    Status After Intervention:  Unchanged    Participation Level: None    Participation Quality: Resistant      Speech:  normal      Thought Process/Content: Hallucinating      Affective Functioning: Congruent      Mood: irritable      Level of consciousness:  Preoccupied      Response to Learning: Resistant      Endings: None Reported    Modes of Intervention: Support, Socialization, Exploration, and Media      Discipline Responsible: /Counselor      Signature:  ALEXIS Ward, LPC  Board-Certified Music Therapist  Licensed Professional Counselor

## 2024-05-31 NOTE — GROUP NOTE
Art Therapy Group Progress Note    PATIENT SCHEDULED FOR GROUP AT: 9:00 AM     GROUP STOP TIME:  10:15 AM     ATTENDANCE: Moderate (6/12 participants)     TOPIC / FOCUS: Patterned Mandala     GOALS: define mindfulness, practice deep breathing, promote concentration, disrupt negative thinking, increase autonomy, encourage effective coping skills, psychoeducation on flow experiences, promote creativity, reduce feelings of stress and anxiety, increase relaxation     PARTICIPATION LEVEL:  Pt did not attend.     Mika Bustillos  Art Therapist, MA, ATR-P  Provisional Registered Art Therapist     Pt was observed responding to internal stimuli during group.

## 2024-06-01 PROCEDURE — 1240000000 HC EMOTIONAL WELLNESS R&B

## 2024-06-01 PROCEDURE — 6370000000 HC RX 637 (ALT 250 FOR IP): Performed by: PSYCHIATRY & NEUROLOGY

## 2024-06-01 PROCEDURE — 99231 SBSQ HOSP IP/OBS SF/LOW 25: CPT | Performed by: PSYCHIATRY & NEUROLOGY

## 2024-06-01 PROCEDURE — 6370000000 HC RX 637 (ALT 250 FOR IP)

## 2024-06-01 RX ADMIN — DIVALPROEX SODIUM 750 MG: 250 TABLET, DELAYED RELEASE ORAL at 20:16

## 2024-06-01 RX ADMIN — HYDROXYZINE PAMOATE 50 MG: 50 CAPSULE ORAL at 12:04

## 2024-06-01 RX ADMIN — HYDROXYZINE PAMOATE 50 MG: 50 CAPSULE ORAL at 20:16

## 2024-06-01 RX ADMIN — THERA TABS 1 TABLET: TAB at 10:06

## 2024-06-01 RX ADMIN — OLANZAPINE 10 MG: 5 TABLET, ORALLY DISINTEGRATING ORAL at 10:06

## 2024-06-01 RX ADMIN — TRAZODONE HYDROCHLORIDE 150 MG: 50 TABLET ORAL at 20:17

## 2024-06-01 RX ADMIN — OLANZAPINE 15 MG: 5 TABLET, ORALLY DISINTEGRATING ORAL at 20:17

## 2024-06-01 RX ADMIN — BENZTROPINE MESYLATE 1 MG: 1 TABLET ORAL at 10:06

## 2024-06-01 RX ADMIN — ACETAMINOPHEN 650 MG: 325 TABLET ORAL at 18:20

## 2024-06-01 RX ADMIN — CETIRIZINE HYDROCHLORIDE 10 MG: 10 TABLET, FILM COATED ORAL at 10:06

## 2024-06-01 RX ADMIN — ACETAMINOPHEN 650 MG: 325 TABLET ORAL at 12:04

## 2024-06-01 RX ADMIN — HALOPERIDOL 5 MG: 5 TABLET ORAL at 12:04

## 2024-06-01 RX ADMIN — DIVALPROEX SODIUM 750 MG: 250 TABLET, DELAYED RELEASE ORAL at 10:06

## 2024-06-01 RX ADMIN — LORAZEPAM 1 MG: 1 TABLET ORAL at 12:46

## 2024-06-01 ASSESSMENT — PAIN SCALES - GENERAL
PAINLEVEL_OUTOF10: 3
PAINLEVEL_OUTOF10: 0
PAINLEVEL_OUTOF10: 5
PAINLEVEL_OUTOF10: 0
PAINLEVEL_OUTOF10: 0

## 2024-06-01 ASSESSMENT — PAIN DESCRIPTION - ORIENTATION
ORIENTATION: OTHER (COMMENT)
ORIENTATION: RIGHT;LEFT

## 2024-06-01 ASSESSMENT — PAIN DESCRIPTION - DESCRIPTORS
DESCRIPTORS: ACHING
DESCRIPTORS: ACHING

## 2024-06-01 ASSESSMENT — PAIN DESCRIPTION - LOCATION
LOCATION: HEAD
LOCATION: HEAD

## 2024-06-01 ASSESSMENT — PAIN DESCRIPTION - PAIN TYPE
TYPE: ACUTE PAIN
TYPE: ACUTE PAIN

## 2024-06-01 ASSESSMENT — PAIN DESCRIPTION - FREQUENCY
FREQUENCY: INTERMITTENT
FREQUENCY: CONTINUOUS

## 2024-06-01 ASSESSMENT — PAIN DESCRIPTION - ONSET
ONSET: GRADUAL
ONSET: GRADUAL

## 2024-06-01 NOTE — GROUP NOTE
Art Therapy Group Progress Note    PATIENT SCHEDULED FOR GROUP AT: 12:00 PM     GROUP STOP TIME:  12:45 PM     ATTENDANCE: Moderate (4/10 participants)     TOPIC / FOCUS:  Zine of Gratitude or Jennifer      GOALS:  promote positive thinking, encourage focus, increase feelings of jennifer, shift thoughts through focusing on positive thinking, reduce stress, reduce anxiety, provide positive coping skills, encourage creative thinking     PARTICIPATION LEVEL:  Pt did not attend.     Mika Bustillos  Art Therapist, MA, ATR-P  Provisional Registered Art Therapist      Pt observed responding to internal stimuli.

## 2024-06-02 PROCEDURE — 6370000000 HC RX 637 (ALT 250 FOR IP)

## 2024-06-02 PROCEDURE — 99232 SBSQ HOSP IP/OBS MODERATE 35: CPT | Performed by: PSYCHIATRY & NEUROLOGY

## 2024-06-02 PROCEDURE — 1240000000 HC EMOTIONAL WELLNESS R&B

## 2024-06-02 PROCEDURE — 6370000000 HC RX 637 (ALT 250 FOR IP): Performed by: PSYCHIATRY & NEUROLOGY

## 2024-06-02 RX ADMIN — TRAZODONE HYDROCHLORIDE 150 MG: 50 TABLET ORAL at 19:43

## 2024-06-02 RX ADMIN — THERA TABS 1 TABLET: TAB at 08:03

## 2024-06-02 RX ADMIN — DIVALPROEX SODIUM 750 MG: 250 TABLET, DELAYED RELEASE ORAL at 21:00

## 2024-06-02 RX ADMIN — DIVALPROEX SODIUM 750 MG: 250 TABLET, DELAYED RELEASE ORAL at 08:02

## 2024-06-02 RX ADMIN — LORAZEPAM 1 MG: 1 TABLET ORAL at 13:12

## 2024-06-02 RX ADMIN — ACETAMINOPHEN 650 MG: 325 TABLET ORAL at 10:35

## 2024-06-02 RX ADMIN — BENZTROPINE MESYLATE 1 MG: 1 TABLET ORAL at 08:03

## 2024-06-02 RX ADMIN — HYDROXYZINE PAMOATE 50 MG: 50 CAPSULE ORAL at 14:03

## 2024-06-02 RX ADMIN — HALOPERIDOL 5 MG: 5 TABLET ORAL at 11:29

## 2024-06-02 RX ADMIN — OLANZAPINE 10 MG: 5 TABLET, ORALLY DISINTEGRATING ORAL at 08:03

## 2024-06-02 RX ADMIN — OLANZAPINE 15 MG: 5 TABLET, ORALLY DISINTEGRATING ORAL at 19:43

## 2024-06-02 RX ADMIN — CETIRIZINE HYDROCHLORIDE 10 MG: 10 TABLET, FILM COATED ORAL at 08:03

## 2024-06-02 RX ADMIN — ACETAMINOPHEN 650 MG: 325 TABLET ORAL at 19:46

## 2024-06-02 RX ADMIN — HYDROXYZINE PAMOATE 50 MG: 50 CAPSULE ORAL at 19:43

## 2024-06-02 ASSESSMENT — PAIN SCALES - GENERAL
PAINLEVEL_OUTOF10: 0
PAINLEVEL_OUTOF10: 6
PAINLEVEL_OUTOF10: 0
PAINLEVEL_OUTOF10: 4
PAINLEVEL_OUTOF10: 0

## 2024-06-02 ASSESSMENT — PAIN DESCRIPTION - PAIN TYPE: TYPE: ACUTE PAIN

## 2024-06-02 ASSESSMENT — PAIN DESCRIPTION - DESCRIPTORS
DESCRIPTORS: ACHING
DESCRIPTORS: ACHING

## 2024-06-02 ASSESSMENT — PAIN SCALES - WONG BAKER
WONGBAKER_NUMERICALRESPONSE: NO HURT

## 2024-06-02 ASSESSMENT — PAIN - FUNCTIONAL ASSESSMENT: PAIN_FUNCTIONAL_ASSESSMENT: ACTIVITIES ARE NOT PREVENTED

## 2024-06-02 ASSESSMENT — PAIN DESCRIPTION - FREQUENCY: FREQUENCY: CONTINUOUS

## 2024-06-02 ASSESSMENT — PAIN DESCRIPTION - LOCATION
LOCATION: SHOULDER
LOCATION: HEAD

## 2024-06-02 ASSESSMENT — PAIN DESCRIPTION - ONSET: ONSET: GRADUAL

## 2024-06-02 ASSESSMENT — PAIN DESCRIPTION - ORIENTATION: ORIENTATION: RIGHT

## 2024-06-03 PROCEDURE — 6370000000 HC RX 637 (ALT 250 FOR IP): Performed by: PSYCHIATRY & NEUROLOGY

## 2024-06-03 PROCEDURE — 99231 SBSQ HOSP IP/OBS SF/LOW 25: CPT | Performed by: PSYCHIATRY & NEUROLOGY

## 2024-06-03 PROCEDURE — 1240000000 HC EMOTIONAL WELLNESS R&B

## 2024-06-03 PROCEDURE — 6370000000 HC RX 637 (ALT 250 FOR IP)

## 2024-06-03 RX ADMIN — CETIRIZINE HYDROCHLORIDE 10 MG: 10 TABLET, FILM COATED ORAL at 09:11

## 2024-06-03 RX ADMIN — OLANZAPINE 15 MG: 5 TABLET, ORALLY DISINTEGRATING ORAL at 20:42

## 2024-06-03 RX ADMIN — THERA TABS 1 TABLET: TAB at 09:11

## 2024-06-03 RX ADMIN — OLANZAPINE 10 MG: 5 TABLET, ORALLY DISINTEGRATING ORAL at 09:11

## 2024-06-03 RX ADMIN — HALOPERIDOL 5 MG: 5 TABLET ORAL at 10:21

## 2024-06-03 RX ADMIN — HALOPERIDOL 5 MG: 5 TABLET ORAL at 20:42

## 2024-06-03 RX ADMIN — BENZTROPINE MESYLATE 1 MG: 1 TABLET ORAL at 09:11

## 2024-06-03 RX ADMIN — HYDROXYZINE PAMOATE 50 MG: 50 CAPSULE ORAL at 20:42

## 2024-06-03 RX ADMIN — ACETAMINOPHEN 650 MG: 325 TABLET ORAL at 13:37

## 2024-06-03 RX ADMIN — DIVALPROEX SODIUM 750 MG: 250 TABLET, DELAYED RELEASE ORAL at 09:11

## 2024-06-03 RX ADMIN — LORAZEPAM 1 MG: 1 TABLET ORAL at 13:26

## 2024-06-03 RX ADMIN — DIVALPROEX SODIUM 750 MG: 250 TABLET, DELAYED RELEASE ORAL at 20:42

## 2024-06-03 RX ADMIN — HYDROXYZINE PAMOATE 50 MG: 50 CAPSULE ORAL at 10:21

## 2024-06-03 ASSESSMENT — PAIN SCALES - GENERAL
PAINLEVEL_OUTOF10: 5
PAINLEVEL_OUTOF10: 0

## 2024-06-03 ASSESSMENT — PAIN DESCRIPTION - PAIN TYPE: TYPE: ACUTE PAIN

## 2024-06-03 ASSESSMENT — PAIN SCALES - WONG BAKER
WONGBAKER_NUMERICALRESPONSE: NO HURT
WONGBAKER_NUMERICALRESPONSE: NO HURT
WONGBAKER_NUMERICALRESPONSE: HURTS LITTLE MORE
WONGBAKER_NUMERICALRESPONSE: NO HURT

## 2024-06-03 ASSESSMENT — PAIN DESCRIPTION - FREQUENCY: FREQUENCY: INTERMITTENT

## 2024-06-03 ASSESSMENT — PAIN DESCRIPTION - DESCRIPTORS: DESCRIPTORS: ACHING

## 2024-06-03 ASSESSMENT — PAIN DESCRIPTION - DIRECTION: RADIATING_TOWARDS: NON-RADIATING

## 2024-06-03 ASSESSMENT — PAIN - FUNCTIONAL ASSESSMENT: PAIN_FUNCTIONAL_ASSESSMENT: ACTIVITIES ARE NOT PREVENTED

## 2024-06-03 ASSESSMENT — PAIN DESCRIPTION - ORIENTATION: ORIENTATION: OTHER (COMMENT)

## 2024-06-03 ASSESSMENT — PAIN DESCRIPTION - LOCATION: LOCATION: GENERALIZED

## 2024-06-03 ASSESSMENT — PAIN DESCRIPTION - ONSET: ONSET: SUDDEN

## 2024-06-03 NOTE — GROUP NOTE
Group Therapy Note    Date: 6/3/2024    Group Start Time: 1310  Group End Time: 1400  Group Topic: Recreational    [facility]    Sterling Paniagua        Group Therapy Note    Attendees: 7/12    Group Type: Stress and Anger Bingo      Group: Focus: Recreational therapy groups engaged patients through a game that focused on stress and anger. Patients examined: external stressors, internal stressors, physical stress symptoms, emotional/behavioral stress symptoms, and stress relievers. PTs identified triggers, symptoms, control and ways to prevent anger. Group may help enhance social and coping skills, and decrease stress, depression and anxiety           Notes:  Patient did not attend group. Patient at times sitting in group setting verbal responding to internal stimuli.   Recreational Therapist  Sterling Paniagua

## 2024-06-03 NOTE — GROUP NOTE
Art Therapy Group Progress Note    PATIENT SCHEDULED FOR GROUP AT: 9:30 AM      GROUP STOP TIME:  10:15 AM      ATTENDANCE: Moderate (7/12 participants)     TOPIC / FOCUS:  Sequential Drawing of Emotions and Drawing Current Feelings     GOALS:  emotion identification, encourage emotional expression, explore similarities and differences in emotion, promote creativity, increase feelings of self-awareness, identify current emotional state, increase emotional communication skills     PARTICIPATION LEVEL:  Pt did not attend.     Mika Bustillos  Art Therapist, MA, ATR-P  Provisional Registered Art Therapist      Pt responding to internal stimuli. Pt was disruptive during group at times reacting irritably toward hallucinations.

## 2024-06-03 NOTE — GROUP NOTE
Group Therapy Note    Date: 6/3/2024    Group Start Time: 1400  Group End Time: 1445  Group Topic: Psychoeducation        Eryn Perez        Group Therapy Note    Attendees: 8  Group Topic- To discuss self care and how self care influences our recovery journey.      Patient was sitting in the day area during the group session. Patient was observed responding to himself and laughing out loud. When asked a question, the patient stated that he was not in the room.   Discipline Responsible: /Counselor      Signature:  Eryn Perez

## 2024-06-03 NOTE — GROUP NOTE
Group Therapy Note    Date: 6/3/2024    Group Start Time: 1500  Group End Time: 1550  Group Topic: Music Therapy      Liliana Montague        Group Therapy Note    Attendees: 8/12    Group Focus: Music therapy group consisted of a collaborative group songwriting. Through songwriting, the group processed past experiences, feelings leading to hospitalization, and what group members do for relaxation and stress relief. The group also included adding instrumentation to the group's song. The group may promote creativity, self-expression, group cohesion, and use of music as a coping skill.       Notes:  Pt sat in the group area for much of the group. Pt was verbally responding to internal stimuli at times in group and did not engage in the content of the group, seeming internally preoccupied.    Status After Intervention:  Unchanged    Participation Level: None    Participation Quality: Resistant      Speech:  normal      Thought Process/Content: Hallucinating      Affective Functioning: Congruent      Mood: irritable      Level of consciousness:  Preoccupied      Response to Learning: Resistant      Endings: None Reported    Modes of Intervention: Support, Socialization, Exploration, and Media      Discipline Responsible: /Counselor      Signature:  ALEXIS Ward, LPC  Board-Certified Music Therapist  Licensed Professional Counselor

## 2024-06-03 NOTE — BH NOTE
Pt appeared to have slept for 6.25 hours thus far( respectful while awake). Pt came out to eat a snack; fell asleep at the table and then returned to bed after being prompted by staff. Will continue to monitor for safety and changes in behavior.

## 2024-06-04 VITALS
BODY MASS INDEX: 21.47 KG/M2 | OXYGEN SATURATION: 100 % | HEIGHT: 70 IN | TEMPERATURE: 97 F | HEART RATE: 67 BPM | DIASTOLIC BLOOD PRESSURE: 78 MMHG | SYSTOLIC BLOOD PRESSURE: 116 MMHG | WEIGHT: 150 LBS | RESPIRATION RATE: 16 BRPM

## 2024-06-04 PROCEDURE — 6370000000 HC RX 637 (ALT 250 FOR IP): Performed by: PSYCHIATRY & NEUROLOGY

## 2024-06-04 PROCEDURE — 6370000000 HC RX 637 (ALT 250 FOR IP)

## 2024-06-04 PROCEDURE — 99239 HOSP IP/OBS DSCHRG MGMT >30: CPT | Performed by: PSYCHIATRY & NEUROLOGY

## 2024-06-04 RX ORDER — BENZTROPINE MESYLATE 1 MG/1
1 TABLET ORAL DAILY
Qty: 30 TABLET | Refills: 0 | Status: SHIPPED | OUTPATIENT
Start: 2024-06-05

## 2024-06-04 RX ORDER — OLANZAPINE 15 MG/1
15 TABLET, ORALLY DISINTEGRATING ORAL NIGHTLY
Qty: 30 TABLET | Refills: 0 | Status: SHIPPED | OUTPATIENT
Start: 2024-06-04

## 2024-06-04 RX ORDER — OLANZAPINE 10 MG/1
10 TABLET, ORALLY DISINTEGRATING ORAL DAILY
Qty: 30 TABLET | Refills: 0 | Status: SHIPPED | OUTPATIENT
Start: 2024-06-05

## 2024-06-04 RX ORDER — TRAZODONE HYDROCHLORIDE 150 MG/1
150 TABLET ORAL NIGHTLY PRN
Qty: 30 TABLET | Refills: 0 | Status: SHIPPED | OUTPATIENT
Start: 2024-06-04

## 2024-06-04 RX ORDER — DIVALPROEX SODIUM 250 MG/1
750 TABLET, DELAYED RELEASE ORAL 2 TIMES DAILY
Qty: 180 TABLET | Refills: 0 | Status: SHIPPED | OUTPATIENT
Start: 2024-06-04

## 2024-06-04 RX ORDER — HYDROXYZINE PAMOATE 50 MG/1
50 CAPSULE ORAL 2 TIMES DAILY PRN
Qty: 60 CAPSULE | Refills: 0 | Status: SHIPPED | OUTPATIENT
Start: 2024-06-04 | End: 2024-07-04

## 2024-06-04 RX ADMIN — OLANZAPINE 10 MG: 5 TABLET, ORALLY DISINTEGRATING ORAL at 07:45

## 2024-06-04 RX ADMIN — BENZTROPINE MESYLATE 1 MG: 1 TABLET ORAL at 07:46

## 2024-06-04 RX ADMIN — THERA TABS 1 TABLET: TAB at 07:49

## 2024-06-04 RX ADMIN — DIVALPROEX SODIUM 750 MG: 250 TABLET, DELAYED RELEASE ORAL at 07:45

## 2024-06-04 RX ADMIN — CETIRIZINE HYDROCHLORIDE 10 MG: 10 TABLET, FILM COATED ORAL at 07:46

## 2024-06-04 NOTE — PROGRESS NOTES
Nutrition Note       Nutrition Recommendations/Plan:   Continue current diet- update preferences.   Plan to add oral nutrition supplement to optimize nutrition intake opportunity: Ensure Plus High Protein (each provides 350 kcal, 20g protein) BID  Monitor PO intake/tolerance of meals/supplements and POC while admitted.       Pt admitted for management of schizoaffective d/o, bipolar type. H/o polysubstance abuse. Received positive nutrition screen for cultural/Faith/ethnic food preferences with comment \"pt is Jain.\" Visited pt- denied avoiding pork. No specific foods avoided for Faith reasons. Denied good allergies. Pt requesting Ensure, agreeable to once daily, will update other preferences.     Estimated needs  Kcal: 4559-2499 kcal (25-30 kcal/kg)  Protein: (0.8-1 g/kg)  Fluid: (1 mL/kcal)    Electronically signed by Millie Woods RD on 5/20/24 at 7:07 AM EDT    Contact: [phone]    
1204: Patient pacing unit, responding to internal stimuli. Patient in room, yelling, screaming at \"Timothy\" and request his PRN medication. Haldol and Vistaril  given.     1245: Patient still responding and request his other pill. Patient given Ativan. Patient responding about \"trump and Timothy\".  Will continue to monitor.   
Behavioral Health Progress Note    Admit Date: 5/16/2024      Vitals : Patient Vitals for the past 8 hrs:   BP Temp Temp src Pulse Resp SpO2   05/26/24 0959 131/83 98.3 °F (36.8 °C) Oral 79 18 98 %     Labs:  No results found for this or any previous visit (from the past 24 hour(s)).  Meds:   Current Facility-Administered Medications   Medication Dose Route Frequency    OLANZapine (ZYPREXA) tablet 10 mg  10 mg Oral BID    traZODone (DESYREL) tablet 100 mg  100 mg Oral Nightly PRN    divalproex (DEPAKOTE ER) extended release tablet 500 mg  500 mg Oral BID    benztropine (COGENTIN) tablet 1 mg  1 mg Oral Q6H PRN    acetaminophen (TYLENOL) tablet 650 mg  650 mg Oral Q4H PRN    aluminum & magnesium hydroxide-simethicone (MAALOX) 200-200-20 MG/5ML suspension 30 mL  30 mL Oral Q6H PRN    haloperidol (HALDOL) tablet 5 mg  5 mg Oral Q6H PRN    Or    haloperidol lactate (HALDOL) injection 5 mg  5 mg IntraMUSCular Q6H PRN    benztropine mesylate (COGENTIN) injection 1 mg  1 mg IntraMUSCular Q6H PRN    multivitamin 1 tablet  1 tablet Oral Daily    benztropine (COGENTIN) tablet 1 mg  1 mg Oral Daily    hydrOXYzine pamoate (VISTARIL) capsule 50 mg  50 mg Oral Q6H PRN    cetirizine (ZYRTEC) tablet 10 mg  10 mg Oral Daily    fluticasone (FLONASE) 50 MCG/ACT nasal spray 2 spray  2 spray Each Nostril Daily PRN      Hospital Problems: Principal Problem:    Schizoaffective disorder, bipolar type (HCC)  Active Problems:    Cannabis use disorder, moderate, dependence (HCC)  Resolved Problems:    * No resolved hospital problems. *      Subjective:   Medication side effects: none      Mental Status Exam  Sensorium: alert  Orientation: only aware of person  Relations: unreliable  Eye Contact: intense  Appearance: is unkempt and foul body odor  Thought Process: fast rate of thoughts, poor abstract reasoning/computation, and loose    Thought Content: delusions and paranoia   Suicidal: denies   Homicidal: denies   Mood: is irritable   Affect: 
Behavioral Health Progress Note    Admit Date: 5/16/2024      Vitals : Patient Vitals for the past 8 hrs:   Resp   06/03/24 0745 18     Labs:  No results found for this or any previous visit (from the past 24 hour(s)).  Meds:   Current Facility-Administered Medications   Medication Dose Route Frequency    traZODone (DESYREL) tablet 150 mg  150 mg Oral Nightly PRN    OLANZapine zydis (ZYPREXA) disintegrating tablet 10 mg  10 mg Oral Daily    OLANZapine zydis (ZYPREXA) disintegrating tablet 15 mg  15 mg Oral Nightly    divalproex (DEPAKOTE) DR tablet 750 mg  750 mg Oral BID    LORazepam (ATIVAN) tablet 1 mg  1 mg Oral Q6H PRN    benztropine (COGENTIN) tablet 1 mg  1 mg Oral Q6H PRN    acetaminophen (TYLENOL) tablet 650 mg  650 mg Oral Q4H PRN    aluminum & magnesium hydroxide-simethicone (MAALOX) 200-200-20 MG/5ML suspension 30 mL  30 mL Oral Q6H PRN    haloperidol (HALDOL) tablet 5 mg  5 mg Oral Q6H PRN    Or    haloperidol lactate (HALDOL) injection 5 mg  5 mg IntraMUSCular Q6H PRN    benztropine mesylate (COGENTIN) injection 1 mg  1 mg IntraMUSCular Q6H PRN    multivitamin 1 tablet  1 tablet Oral Daily    benztropine (COGENTIN) tablet 1 mg  1 mg Oral Daily    hydrOXYzine pamoate (VISTARIL) capsule 50 mg  50 mg Oral Q6H PRN    cetirizine (ZYRTEC) tablet 10 mg  10 mg Oral Daily    fluticasone (FLONASE) 50 MCG/ACT nasal spray 2 spray  2 spray Each Nostril Daily PRN      Hospital Problems: Principal Problem:    Schizoaffective disorder, bipolar type (HCC)  Active Problems:    Cannabis use disorder, moderate, dependence (HCC)  Resolved Problems:    * No resolved hospital problems. *      Subjective:   Medication side effects: none  none    Mental Status Exam  Sensorium: alert  Orientation: only aware of place and person  Relations: cooperative and unreliable  Eye Contact: intense  Appearance: is unkempt and is bizarre  Thought Process: normal rate of thoughts, poor abstract reasoning/computation, and illogical, loose  
Behavioral Health Progress Note    Admit Date: 5/16/2024  Hospital day 6    Vitals : No data found.  Labs:  No results found for this or any previous visit (from the past 24 hour(s)).  Meds:   Current Facility-Administered Medications   Medication Dose Route Frequency    traZODone (DESYREL) tablet 100 mg  100 mg Oral Nightly PRN    divalproex (DEPAKOTE ER) extended release tablet 500 mg  500 mg Oral BID    OLANZapine (ZYPREXA) tablet 5 mg  5 mg Oral Daily    OLANZapine (ZYPREXA) tablet 10 mg  10 mg Oral Nightly    benztropine (COGENTIN) tablet 1 mg  1 mg Oral Q6H PRN    acetaminophen (TYLENOL) tablet 650 mg  650 mg Oral Q4H PRN    aluminum & magnesium hydroxide-simethicone (MAALOX) 200-200-20 MG/5ML suspension 30 mL  30 mL Oral Q6H PRN    haloperidol (HALDOL) tablet 5 mg  5 mg Oral Q6H PRN    Or    haloperidol lactate (HALDOL) injection 5 mg  5 mg IntraMUSCular Q6H PRN    benztropine mesylate (COGENTIN) injection 1 mg  1 mg IntraMUSCular Q6H PRN    multivitamin 1 tablet  1 tablet Oral Daily    benztropine (COGENTIN) tablet 1 mg  1 mg Oral Daily    hydrOXYzine pamoate (VISTARIL) capsule 50 mg  50 mg Oral Q6H PRN    cetirizine (ZYRTEC) tablet 10 mg  10 mg Oral Daily    fluticasone (FLONASE) 50 MCG/ACT nasal spray 2 spray  2 spray Each Nostril Daily PRN      Hospital Problems: Principal Problem:    Schizoaffective disorder, bipolar type ([facility])  Active Problems:    Cannabis use disorder, moderate, dependence ([facility])  Resolved Problems:    * No resolved hospital problems. *      Subjective:   Medication side effects: none      Mental Status Exam  Sensorium: alert  Orientation: only aware of person  Relations: unreliable  Eye Contact: poor  Appearance: is bizarre and is tense  Thought Process: slow rate of thoughts, poor abstract reasoning/computation, and loose    Thought Content: delusions   Suicidal: denies   Homicidal: denies   Mood: is irritable   Affect:  incongruous  Memory: is impaired, is recent, and is remote 
[facility] [place]  Admission Note     Admission Type:    TDO    Reason for admission:   SI Without a plan; responding to internal stimuli  Pt's mother took out a TDO due to pt's aggression prior to admission    PATIENT STRENGTHS:   Strong Rastafari network    Patient Strengths and Limitations:   Poor historian   Positive for THC       Addictive Behavior:    N/a    Medical Problems:   Past Medical History:   Diagnosis Date    Aggressive outburst     Nina (HCC)     Schizophrenia (HCC)     Substance abuse (HCC)        Status EXAM:  Mental Status and Behavioral Exam  Normal: No  Level of Assistance: Independent/Self  Facial Expression: Worried  Affect: Stable  Level of Consciousness: Alert  Frequency of Checks: 4 times per hour, close  Mood:Normal: No  Mood: Anxious  Motor Activity:Normal: No  Motor Activity: Increased  Eye Contact: Fair  Observed Behavior: Cooperative  Sexual Misconduct History: Current - no  Preception: [place] to person  Attention:Normal: No  Attention: Distractible  Thought Processes: Other (comment) (concrete)  Thought Content:Normal: No  Thought Content: Preoccupations  Depression Symptoms: No problems reported or observed.  Anxiety Symptoms: Generalized  Nina Symptoms: No problems reported or observed.  Hallucinations: Auditory (comment)  Delusions: No  Memory:Normal: Yes  Insight and Judgment: No  Insight and Judgment: Poor judgment, Poor insight    Pt admitted with followings belongings:  Dental Appliances: None  Vision - Corrective Lenses: None  Hearing Aid: None  Jewelry: None  Body Piercings Removed: No  Clothing: Shirt, Pants, Footwear, Socks  Other Valuables: Other (Comment) (n/a)     Valuables sent home with n/a Valuables placed in safe in security envelope, number:  n/a Patient's home medications were n/a Patient oriented to surroundings and program expectations and copy of patient rights given. Received admission packet:  yes.  Consents reviewed, signed yes Refused n/a 
[facility]- Behavioral Health  Inpatient Psychiatry Progress Note    Nursing notes:     Compliant with scheduled medications. No noted or reported side effects.   Refused vital signs this morning. VS yesterday were stable.  PRN medications needed yesterday or overnight: Only Trazodone 100 mg for sleep. Did not require Haldol or Ativan for agitation the past 24 hours.  Slept 4.5 hours.  Reported to respond to internal stimuli and noted to be agitated at times, but as above did not require PRNs for agitation.    Assessment:    On assessment today, the pt was seen with other members of interdisciplinary treatment team in the  milieu conference room.    Commended pt for compliance with his Zyprexa and Depakote, both recently increased for psychosis and agitation. I did however encourage him to allow nursing to take his vital signs, and he says that he will. He was adamant, however, about planning to not comply with his Depakote level, which he refused last week as well, due to fear of needles. He states he feels fine in mood, and is not verbally agitated or hostile with treatment team. He denies SI or HI. He denies AH or VH. Denies paranoia, but rather abruptly devolves into disorganized and bizarre delusional speech about writer. He continues with disorganized speech and then leaves the conference room. Prior to this I did let him know that since he is not requiring PRN medications, if he continues to comply with them medications and vital signs, it is possible he may be stable for d/c by early next week and he is appreciative.       Medications  Current Facility-Administered Medications: traZODone (DESYREL) tablet 150 mg, 150 mg, Oral, Nightly PRN  OLANZapine zydis (ZYPREXA) disintegrating tablet 10 mg, 10 mg, Oral, Daily  OLANZapine zydis (ZYPREXA) disintegrating tablet 15 mg, 15 mg, Oral, Nightly  divalproex (DEPAKOTE) DR tablet 750 mg, 750 mg, Oral, BID  [DISCONTINUED] LORazepam (ATIVAN) tablet 1 mg, 
Comprehensive Nutrition Assessment    Type and Reason for Visit:  Reassess    Nutrition Recommendations/Plan:   Continue current diet and oral nutrition supplements.   Continue to monitor PO intake/tolerance of meals/supplements and POC while admitted.      Malnutrition Assessment:  Malnutrition Status:  Insufficient data (05/31/24 0819)    Context:  Acute Illness       Nutrition Assessment:    Pt admitted for management of schizoaffective d/o and polysubstance abuse. Per chart, pt is meal and medication compliant, eating well from meals. Some aggressive behavior noted. Pt requested Ensure once daily at previous visit, will continue to monitor pt per policy.    Nutrition Related Findings:    No output data or edema documented. Labs (5/17) reviewed. Pertinent meds: MVI. Wound Type: None       Current Nutrition Intake & Therapies:    Average Meal Intake: 51-75%, %  Average Supplements Intake: Unable to assess  ADULT ORAL NUTRITION SUPPLEMENT; Lunch; Standard High Calorie/High Protein Oral Supplement  ADULT DIET; Regular; Safety Tray; Safety Tray (Disposables); requesting rice milk with meals as available.    Anthropometric Measures:  Height: 177.8 cm (5' 10\")  Ideal Body Weight (IBW): 166 lbs (75 kg)    Admission Body Weight: 68 kg (149 lb 14.6 oz)  Current Body Weight: 68 kg (149 lb 14.6 oz), 90.3 % IBW. Weight Source: Stated  Current BMI (kg/m2): 21.5  Usual Body Weight: 68 kg (150 lb)  % Weight Change (Calculated): -0.1  Weight Adjustment For: No Adjustment  BMI Categories: Normal Weight (BMI 18.5-24.9)    Estimated Daily Nutrient Needs:  Energy Requirements Based On: Kcal/kg  Weight Used for Energy Requirements: Current  Energy (kcal/day): 9581-0717 (25-30)  Weight Used for Protein Requirements: Current  Protein (g/day): 54-68 (0.8-1)  Method Used for Fluid Requirements: 1 ml/kcal  Fluid (ml/day): 8750-6669    Nutrition Diagnosis:   No nutrition diagnosis at this time     Nutrition Interventions:   Food 
[facility]- Behavioral Health  Inpatient Psychiatry Progress Note    Nursing notes:     Compliant with scheduled medications. No noted or reported side effects.   Refused vital signs this morning, but after much encouragement during my assessment he did agree to them, and they are stable.  PRN medications needed yesterday or overnight: Haldol 5 mg PO and Vistaril 50 mg PO x2, around 1030 am yesterday morning and then again around 5 pm, both due to verbal agitation. Also received Ativan 1 mg PO x1 at night, and Trazodone 100 mg.      Assessment:    On assessment today, the pt is laying in bed sleeping soundly, following his morning medications which include increased Zyprexa dose last night, and increased Depakote this morning, both ordered by writer yesterday due to ongoing agitation. He does wake up for assessment and is calm and relatively linear in speech. He states he feels fine, and after much encouragement does allow for VS to be taken, which he had been refusing. He denies SI or HI. He denies AH or VH. No paranoia or other delusional content elicited, but pt was also tired so thought content was more concrete. He did ask when he could leave, and I let him know we require further stabilization of his psychosis and sustaining of this improvement, which includes allowing us to take vital signs (which as above he did agree to), ongoing compliance with treatment, reduced need of PRNs, and I let him know today he does seem improved.      Medications  Current Facility-Administered Medications: OLANZapine zydis (ZYPREXA) disintegrating tablet 10 mg, 10 mg, Oral, Daily  OLANZapine zydis (ZYPREXA) disintegrating tablet 15 mg, 15 mg, Oral, Nightly  divalproex (DEPAKOTE) DR tablet 750 mg, 750 mg, Oral, BID  [DISCONTINUED] LORazepam (ATIVAN) tablet 1 mg, 1 mg, Oral, Q6H PRN **OR** LORazepam (ATIVAN) tablet 1 mg, 1 mg, Oral, Q6H PRN  traZODone (DESYREL) tablet 100 mg, 100 mg, Oral, Nightly PRN  benztropine 
During dinner, pt seen responding to internal stimuli. Pt sat in front of peer, gesturing to fight him. Pt began yelling at peer. Pt received PRN PO Haldol 5 mg and Vistaril 50 mg. Will continue to monitor for effectiveness.  
[facility]- Behavioral Health  Inpatient Psychiatry Progress Note    Nursing notes:     Compliant with scheduled meds. No noted or reported side effects.   RN was able to obtain two sets of VS this morning: both are normotensive, with pulse trending in 60s so will continue to monitor.  Slept 4 hours (similar to yesterday).  PRN medications needed yesterday or overnight: Haldol 5 mg PO x1 around 2 pm and Vistaril 25 mg PO x2, around 2 pm and around 1030 pm.  Yesterday was verbally hostile and intrusive, witnessed by RN and writer together, and today same RN states he is improved - less labile, less intrusive, compliant with VS.    Assessment:    On assessment today, the pt approaches writer immediately as I enter the milieu, as he has done before. I do note however that he is not verbally hostile or agitated, but remains restless and pressured. He asks when he can go (he is committed). I reminded him of this but he does not have good insight and interrupts. I advised him strongly to take his medications, which include Depakote and Zyprexa and he has remained compliant, and advised him to remain compliant with VS checks, with this compliance beginning today. He denies AH or VH, and does not on his bring up paranoid topics about the [place] or FBI watching him, which were not elicited today in an attempt to maintain the pt calm as he was about to take his morning medications, which he did. He denies SI or HI. He is improving partially in that he is allowing for VS, is less labile or agitated, but remains intrusive and restless, and will monitor for necessity of PRNs today. I will increase his Trazodone to 100 mg prn for sleep as he has been consistently sleeping 4 hours only.      Medications  Current Facility-Administered Medications: divalproex (DEPAKOTE ER) extended release tablet 500 mg, 500 mg, Oral, BID  OLANZapine (ZYPREXA) tablet 5 mg, 5 mg, Oral, Daily  OLANZapine (ZYPREXA) tablet 10 mg, 10 mg, Oral, 
[facility]  Inpatient Progress Note     Date of Service: 05/19/24  Hospital Day: 2     Subjective/Interval History   05/19/24    Treatment Team Notes:  Notes reviewed and/or discussed.     Teddy Moore is a 33 y.o. Black /  male  with Previous history of schizoaffective disorder   and polysubstance use disorder who was brought into the emergency police,  reportedly had become aggressive  with his mother.  When seen today,  he is slightly more coherent.  Thought process continues to be disorganized.  He ist is mumbling to self.     His urine drug screen was positive for THC.  He agrees to take Depakote and Zyprexa.    Objective     Vitals:    05/19/24 0900   BP: 124/78   Pulse: 82   Resp: 18   Temp: 98 °F (36.7 °C)   SpO2:        No results found for this or any previous visit (from the past 24 hour(s)).    Mental Status Examination     Appearance/Hygiene 33 y.o. Black /  male  Hygiene: Disheveled    Behavior/Social Relatedness Appropriate   Musculoskeletal Gait/Station: appropriate  Tone (flaccid, cogwheeling, spastic): not assessed  Psychomotor (hyperkinetic, hypokinetic): Agitation  Involuntary movements (tics, dyskinesias, akathisa, stereotypies): none   Speech   Rate, rhythm, volume, fluency and articulation are appropriate   Mood   Reports irritable mood   Affect    Paranoid    Thought Process Delusional thoughts   Thought Content and Perceptual Disturbances Denies self-injurious behavior (SIB), suicidal ideation (SI), aggressive behavior or homicidal ideation (HI)    Endorses auditory hallucinations   Sensorium and Cognition  Grossly intact   Insight  Limited   Judgment Limited        Assessment/Plan      Psychiatric Diagnoses:   Patient Active Problem List   Diagnosis    Tobacco abuse    Schizoaffective disorder (HCC)    Cannabis abuse, continuous    Schizoaffective disorder, bipolar type (HCC)       Medical Diagnoses: As noted 
[facility]  Inpatient Progress Note     Date of Service: 05/27/24  Hospital Day: 10     Subjective/Interval History   05/27/24    Treatment Team Notes:  Notes reviewed and/or discussed and report that Teddy Moore is a patient with a history of schizoaffective disorder, discussed with nursing staff this morning.      Patient interview: Teddy Moore was interviewed by this writer today.  Rather loose, difficult to follow while he is saying, the patient remains floridly psychotic however showing no evidence of medications related side effects.  He is tolerating current combination of olanzapine 10 mg twice a day and Depakote  mg twice a day.  Not clear as to why to utilize Depakote ER twice daily rather than Depakote DR twice daily.  Will change and switch to the latter.      Objective     Vitals:    05/26/24 0959   BP: 131/83   Pulse: 79   Resp: 18   SpO2: 98%     Vitals are stable    No results found for this or any previous visit (from the past 24 hour(s)).    Mental Status Examination     Appearance/Hygiene 33 y.o. Black /  male  Hygiene: Poor   Behavior/Social Relatedness Agitated but redirectable   Musculoskeletal Gait/Station: appropriate  Tone (flaccid, cogwheeling, spastic): not assessed  Psychomotor (hyperkinetic, hypokinetic): calm   Involuntary movements (tics, dyskinesias, akathisa, stereotypies): none   Speech   Remains pressured   Mood   Irritable   Affect    Labile   Thought Process Loose   Thought Content and Perceptual Disturbances  the patient remains delusional, difficult to follow what he is saying, however following redirection.  He remains grandiose, however he is sleeping patterns are improving.   Sensorium and Cognition  Grossly intact   Insight  Limited to poor   Judgment Provide history        Assessment/Plan      Psychiatric Diagnoses:   Patient Active Problem List   Diagnosis    Tobacco abuse    Schizoaffective disorder, 
Patient at nurses station asking for PRN, ativan given. Patient responding upset, wants to leave so he can shower, he cannot shower here \"due to the fungi, the chemicals in the water cannot kill the fungi, and he is not wanting to lost any toes\".  
Patient educated on ADLs, patient in shower.  
Patient is responding, pacing, talking about \"Congregation, this edwin lexy, into that male orgy mess and Bryce told me to watch their eye, he's not into that aranda stuff, and that deondre, cut his penis off, so he can't talk to him no more, get away from me\". PRN haldol given.   
Patient told staff and nurse to get the fuck out his room close my door. Being very aggressive to patient so staff will continue to monitor.  
Psychosocial Assessment     Admission Reason: delusional & paranoid ideations  TDO brought in by Police especially due to arguing with his mother Also UDS + THC    C-SSRS Screening Completed - Current Suicide Risk:   [] No Risk  [x] Low [] Moderate [] High     Risk Factors: paranoid, fearing government watching everything he does    Protective Factors: apparently supportive family    After consideration of C-SSRS screening results, C-SSRS assessments, and this professional's assessment the patient's overall suicide risk assessed to be:  [x] Low   [] Moderate   [] High     [x] Discussed current suicide risk, protective and risk factors with treatment team to determine safety interventions as applicable.     Gender:  [x] Male [] Female [] Transgender  [] Other    Sexual Orientation:  [x] Heterosexual [] Homosexual [] Bisexual [] Other    Homicidal Ideation:  [] Past [] Present [x] Denies     Onset and Duration of Problem: \"long time\"    Current or Past Mental Health and/or Addictions Treatment (and response to treatment):  [x] Yes, When and Where: but unable to identify  [] No    Substance Use/Alcohol Use/Addiction (document name of substance, age of onset, how much and how often, route of use and date of last use):  [] Reports [] Denies     History of Biomedical Complications Associated with Substance Use/Abuse:  [] Reports [x] Denies  Specify:    Family History of Mental Illness or Substance Use/Abuse:   [] Yes (Specify)  [x] No    Trauma and Abuse History:   [] Reports [] Denies  Specify:      Legal History:  []  Yes (Specify)  [x] No currently on probation     Involvement:  [] Yes (Specify)  [x] No    Level of Education and Cognitive Functioning: school but not specific    Employment and/or Benefits:    [] Yes (Specify)  [x] No      Leisure & Recreational Interests and Hobbies/Coping Skills:  art & weight lifting    Ability to Complete Activities of Daily Living (ADLs):  [x] Yes  [] No (Specify)    Health 
Pt appearing to be responding to internal stimuli. Pt requesting PRN medication. Pt received PRN Haldol 5 mg and Vistaril 50 mg. Pt encouraged to seek staff for emotional support as needed. Will continue to monitor.  
Pt appearing to responding to internal stimuli, yelling and making racial slurs. Pt received Prn PO Haldol 5 mg. Will continue to monitor.  
Pt approaching this nurse reporting stress after already having received PRN Vistaril 50 mg this morning. Pt received PRN Ativan 1 mg. Will continue to monitor for effectiveness.  
Pt coming up to desk c/o anxiety 4/10. Pt received PRN Vistaril. Will continue to monitor for effectiveness.  
Pt coming up to desk c/o anxiety 5/10. Pt received PRN Vistaril. Will continue to monitor for effectiveness.  
Pt complained of anxiety 7/10. Vistaril 50mg PO given per PRN order. Pt tolerated medication well; will continue to monitor pt.    
Pt has order for Depakote level and refused labs this AM; explained to pt the need for lab drawn and pt continued to refuse. Per pt, \"It's my choice to refuse.\" Will continue to monitor.  
Pt in day area loudly responding to internal stimuli and making racial slurs. Pt minimally responsive to PRN medication. MD notified, and gave orders for one-time Zyprexa 5 mg and Ativan 1 mg. Pt is now sitting quietly in his room. Will continue to monitor.  
Pt in day area, appearing to argue to himself. Pt received PRN PO Haldol 5 mg and Vistaril 50 mg. Will continue to monitor for effectiveness.  
Pt observed to be anxious and agitated; pt arguing loudly to self and becoming intrusive towards peers and staff. Pt becomes argumentative when redirected. Haldol 5mg PO and Ativan 1mg PO given per PRN order. Pt tolerated medication well, will continue to monitor pt and intervene as needed.      1513  Pt in dayroom, talking to self. Pt not in any distress, no further complaints. Will continue to monitor pt.    
Pt observed to be anxious and agitated; pt cursing and yelling at peer in dayroom. When redirected, pt became verbally aggressive towards staff. Haldol 5mg PO and Vistaril 50mg PO given per PRN order. Pt tolerated medication well, will continue to monitor pt and intervene as needed.    
Pt pacing in day area, making derogatory statements  and yelling at television. Pt received PRN PO Haldol 5 mg and Vistaril 50 mg. Will continue to monitor.  
Pt received discharge instructions, prescriptions, and emergency numbers. Pt refused to completed satisfaction survey. All personal belongings returned to pt. Pt encouraged to follow-up with outpatient resources and to call with any questions or concerns. Patient was picked up by mother (Sveta Moore).  
Pt refused VS. Verbal education given in regards to assessing VS. CIWA-0. Denies anxiety.  
Pt refused depakote x3. Education provided.  
Pt refused to have morning vital signs telling MHT, \"Get the f--- out of my room!\" Will continue to monitor.  
Pt refused vitals x 3. Education provided.  
Pt responding to internal stimuli, loudly yelling profanities and derogatory statements in day area. Pt received PRN Haldol 5 mg and Vistaril 50 mg. Will continue to monitor for effectiveness.  
Pt seen by [facility].  
Pt yelling in hallway, responding to internal stimuli. Pt received PRN PO Haldol 5 mg and Vistaril 50 mg. Pt encouraged to seek emotional support as needed.  
Pt. Is very intrusive, restless and responding to internal stimuli. PRN haldol and cogentin given. Will continue to monitor for safety and efficacy of the medicine.  
Pt. appeared to have slept for 6 hrs. thus far.  
SOCIAL WORK GROUP THERAPY PROGRESS NOTE    Group Time:  10:30am    Group Topic:  Coping Skills    Group Participation:   Pt was available for group & was active in trying to focus on his handout & sharing info with student nurse. Pt seemed to be responding to internal stimuli & rather suspicious & distracted as kept tracking several others movements around him. Left after about x10 minutes to meet with his Dr & didn't return until lunch time. He had no questions about the handout.    
SUBJECTIVE:    Mr. Moore was discussed with nursing staff and he was seen during rounds. Per nursing staff, the patient continues to respond to internal stimuli. During rounds, the patient reports having slept \"fine\" with intact appetite. He continues to refuse to consent to labs. The patient instructs the undersigned to tell physicians in [place] how he's doing but to do this using body language. He makes other statements that are incoherent and/or illogical. The patient does deny SI and HI. He also denies AH and VH.     OBJECTIVE    Medications  Current Facility-Administered Medications: traZODone (DESYREL) tablet 150 mg, 150 mg, Oral, Nightly PRN  OLANZapine zydis (ZYPREXA) disintegrating tablet 10 mg, 10 mg, Oral, Daily  OLANZapine zydis (ZYPREXA) disintegrating tablet 15 mg, 15 mg, Oral, Nightly  divalproex (DEPAKOTE) DR tablet 750 mg, 750 mg, Oral, BID  [DISCONTINUED] LORazepam (ATIVAN) tablet 1 mg, 1 mg, Oral, Q6H PRN **OR** LORazepam (ATIVAN) tablet 1 mg, 1 mg, Oral, Q6H PRN  benztropine (COGENTIN) tablet 1 mg, 1 mg, Oral, Q6H PRN  acetaminophen (TYLENOL) tablet 650 mg, 650 mg, Oral, Q4H PRN  aluminum & magnesium hydroxide-simethicone (MAALOX) 200-200-20 MG/5ML suspension 30 mL, 30 mL, Oral, Q6H PRN  haloperidol (HALDOL) tablet 5 mg, 5 mg, Oral, Q6H PRN **OR** haloperidol lactate (HALDOL) injection 5 mg, 5 mg, IntraMUSCular, Q6H PRN  benztropine mesylate (COGENTIN) injection 1 mg, 1 mg, IntraMUSCular, Q6H PRN  multivitamin 1 tablet, 1 tablet, Oral, Daily  benztropine (COGENTIN) tablet 1 mg, 1 mg, Oral, Daily  hydrOXYzine pamoate (VISTARIL) capsule 50 mg, 50 mg, Oral, Q6H PRN  cetirizine (ZYRTEC) tablet 10 mg, 10 mg, Oral, Daily  fluticasone (FLONASE) 50 MCG/ACT nasal spray 2 spray, 2 spray, Each Nostril, Daily PRN     Physical  /67   Pulse 78   Temp 97 °F (36.1 °C) (Oral)   Resp 16   Ht 1.778 m (5' 10\")   Wt 68 kg (150 lb)   SpO2 99%   BMI 21.52 kg/m²     Mental Status Examination:    Level 
SW Contact:      Pt Contact: Pt was observed to be agitated & demanding at various times on unit, eventually returning to room. About x30 minutes later writer attempted to offer support & help pt identify specific goal for the day.    From outset when writer entered his room he began immediately directing me to leave & take all my government friends out of the building. He demonstrated more irritability while reminder a group was starting & if he'd like to try to at least sit & observe for a short time.    Reality orientation continued with me again reminding him of my role on tx team & thanking him for his managing his behavior & asking for space.from others. He had no questions or requests other than to \"be left alone\".    Dr & tx team updated.      
SW Contact:      Pt Contact: SW team reviewed with pt his d/c & safety Plan to include:    Outpt services with:   [facility] [address] [place] [address]  # [phone]  Emergency Hotline: #[phone]    Staff coordinating housing with pt's mom. She may pay for pt hotel room ?     & tx team updated    
SW Contact:      Pt Contact: another encounter with pt not remembering who writer is, demanding I leave his room, while apparently responding to internal stimuli. Again explained my role on tx team & he cursed & again told writer to mind own business.    Nursing staff  confirmed he has been this way often during shift.     Writer will also continue reality orientation & offer support and encouragement, especially regarding personal hygiene.    Dr & tx team updated.  
SW Contact:      Pt Contact: despite pt continuing to respond to Internal Stimuli he was willing to attend Tx Team meeting. Speech still garbled & thoughts disorganized at times but there were some pockets of clarity that were reality based.    He agreed to comply with vitals, but resistant to Depakote levels being drawn ( \"no one will poke holes in me\" ). He agreed will work on not being irritable but resistant to attending groups & activities.    Tentative d/c plan includes med management [facility] & to stay in hotel for awhile ( that mom will pay for ), since her house currently too crowed. He refused to sign release for us to speak to her, so baseline could be established.    Towards end pt more impatient, with burst of irrational, disorganized & somewhat bizarre comments. Reality orientation continues.    Dr & tx team updated  
SW Contact:      Pt Contact: few contacts were met mostly with irritability both in day area. No interest in groups, activities or any contact with writer.    Thinking disorganized, responding to internal stimuli & observed speaking directly to others not there or the television when he's watching it. Gets irritated easily if others approach or try to offer support.    Will continue reality orientation & encourage group attendance, even at distance, just to listen to discussion.    He has no one or interest in discussing or signing any releases or anything related to d/c plan.     & tx team updated  
SW Contact:      Pt Contact: today's contact was similar to last couple days. Pt was accusatory of writer being part of family trying to control others. He kept making reference to my uncles, brothers & sisters all part of bad plan for people. The longer I stayed, the more irritable he became.    Writer continues to show pt my hospital ID badge & explain my role, which means nothing. Pt continues to point a lot in the air & keeps constantly responding to internal stimuli, even when speaking to writer.    Efforts continue to be made regarding his personal hygiene, which agitates him more, as well as reminders about meal times & opportunity to attend activities & groups.     & tx team updated  
SW Contact:      Pt Contact: x2 with pt continuing to be responding to internal stimuli. He asked pt to leave him alone, as accused pt of \"being mean doing crooked things at my hospital [place]\".     More this writer reassured him I never worked in [place] at a hospital the more agitated pt became with some profane language & waving writer to walk away.    Later encouraged pt to comply with meds in front of nurse station & again irritable & responding to internal stimuli entire time, again waving writer to leave him alone.    Continue reality orientation.    Dr & tx team updated.  
[facility] paged to complete History and Physical.  
[facility]  Inpatient Psychiatry Progress Note    Nursing notes:     Compliant with scheduled medications. No noted or reported side effects.   VS stable this morning.  Refused Depakote level this morning.  PRN medications needed yesterday or overnight: Haldol 5 mg x1 at 2 pm and Trazodone 150 mg for sleep.   Slept 6.5 hours (improved on higher dose of trazodone - was previously sleeping 4 hours)  Reported to respond to internal stimuli and noted to be intrusive at times, but overall less hostile or agitated than earlier in the week.    Assessment:    On assessment today, the pt was seen in the milieu where he was noted to be pacing, but not agitated or hostile. Commended pt for compliance with his Zyprexa and Depakote, though I did ask him if he would let us get his Depakote level and he said again no, as he does not like needles. He has improved in compliance with vital signs, which are stable. He states he feels fine in mood, and as above is not verbally agitated or hostile. He denies SI or HI. He denies AH or VH. Denies paranoia, and is less disorganized in thought process, though continues to have poor insight into his mental illness overall. I did let him know that since he is not requiring as much PRN medication, and has remained compliant with scheduled medications and vital signs, that if he continues to improve and stabilize over the weekend, it is possible he may be at baseline for discharge by early next week.      Medications  Current Facility-Administered Medications: traZODone (DESYREL) tablet 150 mg, 150 mg, Oral, Nightly PRN  OLANZapine zydis (ZYPREXA) disintegrating tablet 10 mg, 10 mg, Oral, Daily  OLANZapine zydis (ZYPREXA) disintegrating tablet 15 mg, 15 mg, Oral, Nightly  divalproex (DEPAKOTE) DR tablet 750 mg, 750 mg, Oral, BID  [DISCONTINUED] LORazepam (ATIVAN) tablet 1 mg, 1 mg, Oral, Q6H PRN **OR** LORazepam (ATIVAN) tablet 1 mg, 1 mg, Oral, Q6H 
haloperidol lactate (HALDOL) injection 5 mg, 5 mg, IntraMUSCular, Q6H PRN  benztropine mesylate (COGENTIN) injection 1 mg, 1 mg, IntraMUSCular, Q6H PRN  multivitamin 1 tablet, 1 tablet, Oral, Daily  benztropine (COGENTIN) tablet 1 mg, 1 mg, Oral, Daily  hydrOXYzine pamoate (VISTARIL) capsule 50 mg, 50 mg, Oral, Q6H PRN  cetirizine (ZYRTEC) tablet 10 mg, 10 mg, Oral, Daily  fluticasone (FLONASE) 50 MCG/ACT nasal spray 2 spray, 2 spray, Each Nostril, Daily PRN     Vital signs:  /65   Pulse 61   Temp 98.4 °F (36.9 °C) (Oral)   Resp 18   Ht 1.778 m (5' 10\")   Wt 68 kg (150 lb)   SpO2 99%   BMI 21.52 kg/m²     Review of Systems   Constitutional: Negative for chills and weight loss.   HENT: Negative for ear pain or nosebleed.    Eyes: Negative for blurred vision or photophobia.   Respiratory: Negative for cough, shortness of breath or wheezing.    Cardiovascular: Negative for chest pain and palpitations.   Gastrointestinal: Negative for abdominal pain, diarrhea or vomiting.   Genitourinary: Negative for dysuria or urgency.   Musculoskeletal: Negative for back pain or joint pain.   Skin: Negative for itching and rash.   Neurological: Negative for seizures, tremors or dystonia.        Mental Status Examination:    Level of consciousness: awake and alert throughout  Orientation: oriented to all spheres  Appearance: hospital attire, disheveled  Behavior/Motor: He is restless and pressured in speech  Attitude toward examiner:  intrusive and pressured, but does not escalate verbally with writer today  Speech: pressured speech, normal tone  Mood:  agitated  Affect:  labile  Thought processes: tangential and disorganized  Thought content: denies suicidal or homicidal ideation, or auditory or visual hallucinations, does appear paranoid and disorganized in thought content when not asked direct questions  Memory: intact per interview  Attention: impaired due to disorganized speech  Insight & Judgement: both impaired 
haloperidol lactate (HALDOL) injection 5 mg, 5 mg, IntraMUSCular, Q6H PRN  benztropine mesylate (COGENTIN) injection 1 mg, 1 mg, IntraMUSCular, Q6H PRN  multivitamin 1 tablet, 1 tablet, Oral, Daily  benztropine (COGENTIN) tablet 1 mg, 1 mg, Oral, Daily  hydrOXYzine pamoate (VISTARIL) capsule 50 mg, 50 mg, Oral, Q6H PRN  cetirizine (ZYRTEC) tablet 10 mg, 10 mg, Oral, Daily  fluticasone (FLONASE) 50 MCG/ACT nasal spray 2 spray, 2 spray, Each Nostril, Daily PRN     Vital signs:  /83   Pulse 79   Temp 98.3 °F (36.8 °C) (Oral)   Resp 18   Ht 1.778 m (5' 10\")   Wt 68 kg (150 lb)   SpO2 98%   BMI 21.52 kg/m²     Review of Systems   Constitutional: Negative for chills and weight loss.   HENT: Negative for ear pain or nosebleed.    Eyes: Negative for blurred vision or photophobia.   Respiratory: Negative for cough, shortness of breath or wheezing.    Cardiovascular: Negative for chest pain and palpitations.   Gastrointestinal: Negative for abdominal pain, diarrhea or vomiting.   Genitourinary: Negative for dysuria or urgency.   Musculoskeletal: Negative for back pain or joint pain.   Skin: Negative for itching and rash.   Neurological: Negative for seizures, tremors or dystonia.        Mental Status Examination:    Level of consciousness: awake and alert throughout  Orientation: oriented to all spheres  Appearance: hospital attire, disheveled  Behavior/Motor: He is restless and pressured in speech  Attitude toward examiner:  intrusive and pressured  Speech: pressured speech, normal tone  Mood:  agitated  Affect:  labile  Thought processes: tangential and disorganized  Thought content: paranoid and disorganized in thought content and does not participate in directed questions today  Memory: intact per interview  Attention: impaired due to disorganized speech  Insight & Judgement: both impaired       DIAGNOSIS (DSM-5):    Psychiatric:  Schizoaffective disorder, bipolar type  Cannabis use disorder, 
malodorous  Behavior/Motor: Intermittent psychomotor agitation on the unit  Attitude toward examiner:  Cooperative; guarded  Speech:  Normal in rate, volume and tone during the interview; minimal; observed to be loud with an agitated tone while on the unit  Mood:  Stated mood is, \"Fine.\"   Affect:  Flat   Thought processes:  Goal-directed; succinct  Thought content: Denies psychosis; noted to respond to internal stimuli throughout the interview; also observed on the unit responding loudly using profane language; denies  SI and HI  Insight:  Poor  Judgment:  Impaired      ASSESSMENT    Schizoaffective disorder, bipolar type (HCC); Cannabis use disorder, moderate    PLAN  Schizoaffective disorder-Continue Zyprexa Zydis 10mg qAM and 15mg qhs, Cogentin 1mg daily, and Depakote 750mg bid. Will continue to encourage patient to comply with laboratory monitoring.  Continue meds for physical health.   Continue hospitalization. Will continue to monitor patient for safety.       
strange  Memory: is impaired, is recent, and is remote     Concentration: distractable  Abstraction: concrete  Insight: The patient shows no insight    OR Poor  Judgement: is psychologically impaired OR  Poor    Assessment/Plan:   not changed    Continue close observation,  Nurses report patient has not wanted to get up out of bed.  He has minimal interactions saying he just wants to sleep.  He refused his vital signs.  He refused Depakote level.  Nurses say that he generally does not cooperate with anything in the morning time.  I am going to rewrite to try to get the Depakote level today at 4 PM since it does not appear he will be allowing it to be drawn in the morning time.  Minimal interactions other than saying \"I want to leave.\"  He does not want to answer questions regarding hallucinations or delusions.  He has very strange affect and is quite suspicious.  Continue reality orientation and antipsychotic medication management.  
location, date and time  Appearance: hospital attire, disheveled  Behavior/Motor: He is restless and is verbally agitated  Attitude toward examiner:  intrusive and verbally hostile  Speech: pressured with loud tone   Mood:  agitated  Affect:  labile  Thought processes: tangential and disorganized  Thought content: did not allow writer to ask questions about suicidal or homicidal ideation, or auditory or visual hallucinations, does appear paranoid and disorganized in thought content  Memory: intact per interview  Attention: impaired due to disorganized speech  Insight & Judgement: both impaired       DIAGNOSIS (DSM-5):    Psychiatric:  Schizoaffective disorder, bipolar type  Cannabis use disorder, moderate    Medical:  Allergic rhinitis      TREATMENT PLAN:    Medications:     Patient was involuntarily committed at his TDO hearing on 5/20. He has been compliant with medications, but not with vital signs.    Continue Depakote 500 mg BID (increased on 5/20) for mood stabilization and control of agitation and impulsivity. VPA level ordered for 5/23.    Continue Zyprexa to 5 mg AM + 10 mg HS (increased on 5/20) for treatment of psychosis (hallucinations, paranoia, and agitation), and adjunctive for mood stabilization.    Continue to attempt VS check on this pt due to ongoing refusal.    Continue Haldol 5 mg q6h prn PO/IM for psychosis/agitation along with Cogentin 1 mg q6h prn PO/IM for EPS/dystonia.    Continue Vistaril 50 mg q6h prn for acute anxiety, and Trazodone 50 mg HS prn for sleep disturbance.    CIWA discontinued on 5/20. Patient had reported use of Ativan for 10 days prior to admission, and did not report nor exhibit signs or symptoms of benzodiazepine withdrawal for 72 hours leading up to 5/20.     [facility] consulted for medical evaluation, physical and management of any active and/or chronic medical conditions. See their assessment for details.       Labs/Studies: [facility] 
unremarkable, except UDS +THC. VPA level at 77 indicating compliance with Depakote. EKG with NSR and QTc interval stable at 396.      Risk Management:   standard precautions, including q15 min checks, suicide precautions, elopement precautions. Patient does not require 1:1 observation due to denying SI and HI and elenita for safety.       Psychotherapy: Participation in milieu and group and individual sessions with Attending Physician, , and Recreational Therapists.      Estimated length of stay:  Approximately 5 more days needed to stabilize acute psychosis and agitation and to titrate medications safely to efficacy.       Risks/benefits/side effects/alternatives to treatment were discussed. Patient voiced understanding and is agreeable to treatment plan.     Total time spent: Includes face to face time with pt, discussion with RN and SW, chart review, MDM, and documentation: 25 mins.     Jose Luis Martinez MD  Medical Director  [facility]

## 2024-06-04 NOTE — GROUP NOTE
Art Therapy Group Progress Note    PATIENT SCHEDULED FOR GROUP AT: 9:30 AM      GROUP STOP TIME:  10:15 AM      ATTENDANCE: Moderate (8/15 participants)     TOPIC / FOCUS:  Create Destroy Transform     GOALS:  practice bilateral stimulation, promote self-expression, increase focus, decreasing stress and anxiety, promote creative problem solving, encourage effective coping skills, increase engagement, promote trauma resolution and healing, increase creativity    PARTICIPATION LEVEL:  Pt did not attend.     Mika Bustillos  Art Therapist, MA, ATR-P  Provisional Registered Art Therapist     Pt observed pacing unit and responding to internal stimuli. Pt was less disruptive than previous day. Pt did not appear to be as irritable toward internal stimuli.

## 2024-06-04 NOTE — PLAN OF CARE
Problem: Behavior  Goal: Pt/Family maintain appropriate behavior and adhere to behavioral management agreement, if implemented  Description: INTERVENTIONS:  1. Assess patient/family's coping skills and  non-compliant behavior (including use of illegal substances)  2. Notify security of behavior or suspected illegal substances which indicate the need for search of the family and/or belongings  3. Encourage verbalization of thoughts and concerns in a socially appropriate manner  4. Utilize positive, consistent limit setting strategies supporting safety of patient, staff and others  5. Encourage participation in the decision making process about the behavioral management agreement  6. If a visitor's behavior poses a threat to safety call refer to organization policy.  7. Initiate consult with , Psychosocial CNS, Spiritual Care as appropriate  Outcome: Progressing     2042- pt has been isolated to room.  Pt took a shower and has been sitting on bed looking out window.  Pt denied si/hi, but was preoccupied (appearing to be responding to internal stimuli.  Flight of ideas noted.  Will continue to monitor and support as needed.     0228-pt has been awake and alert in his room responding to internal stimuli.  Pt started yelling out loudly and repeatedly.  Redirection was provided and unsuccessful.  Pt kept stating, \"get the fuck out of here!\" Will continue to redirect and re-orient to time and reality.   
  Problem: Psychosis  Goal: Will report no hallucinations or delusions  Description: INTERVENTIONS:  1. Administer medication as  ordered  2. Assist with reality testing to support increasing orientation  3. Assess if patient's hallucinations or delusions are encouraging self harm or harm to others and intervene as appropriate  5/26/2024 2317 by Sea Giordano RN  Outcome: Not Progressing     Problem: Behavior  Goal: Pt/Family maintain appropriate behavior and adhere to behavioral management agreement, if implemented  Description: INTERVENTIONS:  1. Assess patient/family's coping skills and  non-compliant behavior (including use of illegal substances)  2. Notify security of behavior or suspected illegal substances which indicate the need for search of the family and/or belongings  3. Encourage verbalization of thoughts and concerns in a socially appropriate manner  4. Utilize positive, consistent limit setting strategies supporting safety of patient, staff and others  5. Encourage participation in the decision making process about the behavioral management agreement  6. If a visitor's behavior poses a threat to safety call refer to organization policy.  7. Initiate consult with , Psychosocial CNS, Spiritual Care as appropriate  5/26/2024 2317 by Sea Giordano RN  Outcome: Not Progressing     Pt presents with dull affect, labile mood, disorganized thought process, delusions of grandeur, paranoia. Pt appears to be responding to internal stimuli, arguing with himself in the day area. Pt has been withdrawn to self on the unit. Pt denies SI/HI/AVH. Pt refused a.m. vital signs. Pt is medication compliant.   
  Problem: Psychosis  Goal: Will report no hallucinations or delusions  Description: INTERVENTIONS:  1. Administer medication as  ordered  2. Assist with reality testing to support increasing orientation  3. Assess if patient's hallucinations or delusions are encouraging self harm or harm to others and intervene as appropriate  5/26/2024 2317 by Sea Giordano, RN  Outcome: Not Progressing  5/26/2024 1110 by Grace Mckeon, RN  Outcome: Progressing     Problem: Behavior  Goal: Pt/Family maintain appropriate behavior and adhere to behavioral management agreement, if implemented  Description: INTERVENTIONS:  1. Assess patient/family's coping skills and  non-compliant behavior (including use of illegal substances)  2. Notify security of behavior or suspected illegal substances which indicate the need for search of the family and/or belongings  3. Encourage verbalization of thoughts and concerns in a socially appropriate manner  4. Utilize positive, consistent limit setting strategies supporting safety of patient, staff and others  5. Encourage participation in the decision making process about the behavioral management agreement  6. If a visitor's behavior poses a threat to safety call refer to organization policy.  7. Initiate consult with , Psychosocial CNS, Spiritual Care as appropriate  Outcome: Not Progressing    Pt Has been labile and irritable all evening while presenting with expressionless facial expressions and an unstable affect. Pt was medication compliant. While pt denied all SI, HI and AVH he is seen constantly responding to internal stimuli and and visual hallucinations. Pt did report feeling anxious because \"the [facility] is after me, they already have cameras in the roof.\" Pt elected to take PRN Cogentin 1 mg and Trazodone 100 mg with evening medications. Pt was in day room all evening talking to self, laughing and responding to internal stimuli. Pt regularly uses 
  Problem: Psychosis  Goal: Will report no hallucinations or delusions  Description: INTERVENTIONS:  1. Administer medication as  ordered  2. Assist with reality testing to support increasing orientation  3. Assess if patient's hallucinations or delusions are encouraging self harm or harm to others and intervene as appropriate  6/1/2024 0957 by Echo Roque RN  Outcome: Progressing     Problem: Behavior  Goal: Pt/Family maintain appropriate behavior and adhere to behavioral management agreement, if implemented  Description: INTERVENTIONS:  1. Assess patient/family's coping skills and  non-compliant behavior (including use of illegal substances)  2. Notify security of behavior or suspected illegal substances which indicate the need for search of the family and/or belongings  3. Encourage verbalization of thoughts and concerns in a socially appropriate manner  4. Utilize positive, consistent limit setting strategies supporting safety of patient, staff and others  5. Encourage participation in the decision making process about the behavioral management agreement  6. If a visitor's behavior poses a threat to safety call refer to organization policy.  7. Initiate consult with , Psychosocial CNS, Spiritual Care as appropriate  Outcome: Progressing       Problem: Drug Abuse/Detox  Goal: Will have no detox symptoms and will verbalize plan for changing drug-related behavior  Description: INTERVENTIONS:  1. Administer medication as ordered  2. Monitor physical status  3. Provide emotional support with 1:1 interaction with staff  4. Encourage  recovery focused treatment   Outcome: Progressing   Pt presents with blunted affect,  mood is labile, with irrational/illogical thought process. Pt continues to be paranoid and feels the need to \"protect\" himself from people out to get him. Pt has been withdrawn to self but visible on the unit. Pt displays appropriate boundaries on the unit, adhering to unit guidelines. Pt 
  Problem: Psychosis  Goal: Will report no hallucinations or delusions  Description: INTERVENTIONS:  1. Administer medication as  ordered  2. Assist with reality testing to support increasing orientation  3. Assess if patient's hallucinations or delusions are encouraging self harm or harm to others and intervene as appropriate  6/1/2024 0957 by Echo Roque, RN  Outcome: Progressing     Problem: Safety - Adult  Goal: Free from fall injury  6/1/2024 2023 by Lyly Rajput RN  Flowsheets (Taken 6/1/2024 2023)  Free From Fall Injury:   Instruct family/caregiver on patient safety   Based on caregiver fall risk screen, instruct family/caregiver to ask for assistance with transferring infant if caregiver noted to have fall risk factors  6/1/2024 0957 by Echo Roque RN  Outcome: Progressing   Pt. Is responding to internal stimuli but can be redirected. No aggressive behavior noted. He is compliant with his medications. He is free from falls, self harm or harming others.  
  Problem: Psychosis  Goal: Will report no hallucinations or delusions  Description: INTERVENTIONS:  1. Administer medication as  ordered  2. Assist with reality testing to support increasing orientation  3. Assess if patient's hallucinations or delusions are encouraging self harm or harm to others and intervene as appropriate  Outcome: Not Progressing     
  Problem: Psychosis  Goal: Will report no hallucinations or delusions  Description: INTERVENTIONS:  1. Administer medication as  ordered  2. Assist with reality testing to support increasing orientation  3. Assess if patient's hallucinations or delusions are encouraging self harm or harm to others and intervene as appropriate  Outcome: Not Progressing     Problem: Behavior  Goal: Pt/Family maintain appropriate behavior and adhere to behavioral management agreement, if implemented  Description: INTERVENTIONS:  1. Assess patient/family's coping skills and  non-compliant behavior (including use of illegal substances)  2. Notify security of behavior or suspected illegal substances which indicate the need for search of the family and/or belongings  3. Encourage verbalization of thoughts and concerns in a socially appropriate manner  4. Utilize positive, consistent limit setting strategies supporting safety of patient, staff and others  5. Encourage participation in the decision making process about the behavioral management agreement  6. If a visitor's behavior poses a threat to safety call refer to organization policy.  7. Initiate consult with , Psychosocial CNS, Spiritual Care as appropriate  Outcome: Progressing  Flowsheets (Taken 5/29/2024 1131)  Patient/family maintains appropriate behavior and adheres to behavioral management agreement, if implemented:   Utilize positive, consistent limit setting strategies supporting safety of patient, staff and others   Encourage verbalization of thoughts and concerns in a socially appropriate manner     Pt received in dayroom, alert and identifies self; pt re-oriented to time, place and situation. Pt is keeps to self but is noted to be intrusive at times.  Pt noted to be verbally aggressive towards peers and staff at times and would need redirection. Pt presents with a blunted affect; loose and disorganized thought process and appears disheveled. Pt encouraged to 
  Problem: Psychosis  Goal: Will report no hallucinations or delusions  Description: INTERVENTIONS:  1. Administer medication as  ordered  2. Assist with reality testing to support increasing orientation  3. Assess if patient's hallucinations or delusions are encouraging self harm or harm to others and intervene as appropriate  Outcome: Not Progressing     Problem: Psychosis  Goal: Will report no hallucinations or delusions  Description: INTERVENTIONS:  1. Administer medication as  ordered  2. Assist with reality testing to support increasing orientation  3. Assess if patient's hallucinations or delusions are encouraging self harm or harm to others and intervene as appropriate  Outcome: Not Progressing     Pt received in dayroom, alert and oriented x2; re-oriented pt to time and situation. Pt observed to be guarded and hypervigilant at times. Pt presents with a blunted affect; loose and disorganized thought process and appears disheveled and malodorous. Pt was encouraged to shower, pt refused. Pt denies any SI/HI. Pt responding to internal stimuli as pt cursing and talking loudly to self. Per night shift, pt did not sleep well last night. Pt refused AM v/s. Pt is compliant with AM medications. Pt noted to be intrusive and lack personal boundaries at times and would curse at staff when redirected. Stressed the importance of medication compliance and positive coping skills; encouraged pt to talk to staff in case pt feels anxious or agitated before the situation gets out of hand; pt nodded in understanding. No aggression noted at this time. Will continue to monitor pt.    
  Problem: Psychosis  Goal: Will report no hallucinations or delusions  Description: INTERVENTIONS:  1. Administer medication as  ordered  2. Assist with reality testing to support increasing orientation  3. Assess if patient's hallucinations or delusions are encouraging self harm or harm to others and intervene as appropriate  Outcome: Progressing     2044-pt has been isolated to self pacing unit, responding to internal stimuli, and intrusive.  Pt can be redirected at times, but is very preoccupied with auditory hallucinations.  Pt changes tone and voice depending on what auditory hallucination he is responding to.  Prn meds provided per pt request and scheduled med given early for s/s of increasing agitation/psychosis.  Pt was provided with an opportunity for questions.  Will continue to monitor and support as needed.     2242- pt continues to respond to internal stimuli.  Easily distracted.  Pt was encouraged to take remaining scheduled meds and prn med given for s/s of restlessness.  Meal was provided.  All needs assessed and met.     0029- pt currently resting.  Prn med appears effective.   
  Problem: Risk for Elopement  Goal: Patient will not exit the unit/facility without proper excort  5/17/2024 1834 by Grace Mckeon, RN  Outcome: Progressing     Problem: Self Harm/Suicidality  Goal: Will have no self-injury during hospital stay  Description: INTERVENTIONS:  1.  Ensure constant observer at bedside with Q15M safety checks  2.  Maintain a safe environment  3.  Secure patient belongings  4.  Ensure family/visitors adhere to safety recommendations  5.  Ensure safety tray has been added to patient's diet order  6.  Every shift and PRN: Re-assess suicidal risk via Frequent Screener    5/17/2024 2227 by Lyly Rajput RN  Flowsheets (Taken 5/17/2024 2227)  Will have no self-injury during hospital stay:   Ensure constant observer at bedside with Q15M safety checks   Secure patient belongings   Ensure safety tray has been added to patient's diet order   Every shift and PRN: Re-assess suicidal risk via Frequent Screener  5/17/2024 1834 by Grace Mckeon, RN  Outcome: Progressing   Pt. Is visible in the milieu. He is loose and responding to internal stimuli. He is pleasant and complaint with medications. He is free from falls, self harm or harming others.  
  Problem: Risk for Elopement  Goal: Patient will not exit the unit/facility without proper excort  5/25/2024 1713 by Skylar Mallory RN  Outcome: Progressing     Problem: Pain  Goal: Verbalizes/displays adequate comfort level or baseline comfort level  5/26/2024 0028 by Andres Mullen RN  Outcome: Progressing  5/25/2024 1713 by Skylar Mallory RN  Outcome: Progressing     Problem: Self Harm/Suicidality  Goal: Will have no self-injury during hospital stay  Description: INTERVENTIONS:  1.  Ensure constant observer at bedside with Q15M safety checks  2.  Maintain a safe environment  3.  Secure patient belongings  4.  Ensure family/visitors adhere to safety recommendations  5.  Ensure safety tray has been added to patient's diet order  6.  Every shift and PRN: Re-assess suicidal risk via Frequent Screener    5/26/2024 0028 by Andres Mullen RN  Outcome: Progressing  5/25/2024 1713 by Skylar Mallory RN  Outcome: Progressing     Problem: Involuntary Admit  Goal: Will cooperate with staff recommendations and doctor's orders and will demonstrate appropriate behavior  Description: INTERVENTIONS:  1. Treat underlying conditions and offer medication as ordered  2. Educate regarding involuntary admission procedures and rules  3. Contain excessive/inappropriate behavior per unit and hospital policies  Outcome: Progressing       Pt has been in his room for most of the shift.  Continues to ris, frequently talking to hisself loudly in his room.  Thought is disorganized and illogical, makes disparaging comments that often include words that are curse, homophobic, racially insensitive and aggressive when responding to internal stimuli.  Is redirectable and has not displayed any physical aggression towards peers or staff.  Was compliant w/ hs scheduled medications and ate hs snack.  Continues to be disheveled and staff continues pt to take a shower and perform personal hygiene.  Will continue to monitor/support  
  Problem: Risk for Elopement  Goal: Patient will not exit the unit/facility without proper excort  5/28/2024 2052 by Lyly Rajput RN  Flowsheets (Taken 5/28/2024 2052)  Nursing Interventions for Elopement Risk: Assist with personal care needs such as toileting, eating, dressing, as needed to reduce the risk of wandering  5/28/2024 1006 by Grace Mckeon, RN  Outcome: Progressing     Problem: Self Harm/Suicidality  Goal: Will have no self-injury during hospital stay  Description: INTERVENTIONS:  1.  Ensure constant observer at bedside with Q15M safety checks  2.  Maintain a safe environment  3.  Secure patient belongings  4.  Ensure family/visitors adhere to safety recommendations  5.  Ensure safety tray has been added to patient's diet order  6.  Every shift and PRN: Re-assess suicidal risk via Frequent Screener    5/28/2024 2052 by Lyly Rajput RN  Flowsheets (Taken 5/21/2024 2120 by Vanessa Hernandez, RN)  Will have no self-injury during hospital stay:   Every shift and PRN: Re-assess suicidal risk via Frequent Screener   Maintain a safe environment  5/28/2024 1006 by Grace Mckeon, RN  Outcome: Progressing   Pt. is visible in the milieu. He is loose, disorganized and intrusive. Pt. Is paranoid, guarded but compliant with his medications. He is free from falls, self harm or harming others.  
  Problem: Risk for Elopement  Goal: Patient will not exit the unit/facility without proper excort  Outcome: Progressing     Problem: Pain  Goal: Verbalizes/displays adequate comfort level or baseline comfort level  Outcome: Progressing     Problem: Self Harm/Suicidality  Goal: Will have no self-injury during hospital stay  Description: INTERVENTIONS:  1.  Ensure constant observer at bedside with Q15M safety checks  2.  Maintain a safe environment  3.  Secure patient belongings  4.  Ensure family/visitors adhere to safety recommendations  5.  Ensure safety tray has been added to patient's diet order  6.  Every shift and PRN: Re-assess suicidal risk via Frequent Screener    6/4/2024 0856 by Skylar Mallory, RN  Outcome: Progressing   Patient presents bizarre, preoccupied, and guarded.  Patient approached this writer requesting Tylenol for a bacterial infection. Patient denies pain; he states he needs the Tylenol to treat his infection.  Patient educated on the indicated reasons to administer Tylenol (pain or fever) and he has neither.  After morning med pass, patient retreated to his room.    
  Problem: Risk for Elopement  Goal: Patient will not exit the unit/facility without proper excort  Outcome: Progressing     Problem: Pain  Goal: Verbalizes/displays adequate comfort level or baseline comfort level  Outcome: Progressing     Problem: Self Harm/Suicidality  Goal: Will have no self-injury during hospital stay  Description: INTERVENTIONS:  1.  Ensure constant observer at bedside with Q15M safety checks  2.  Maintain a safe environment  3.  Secure patient belongings  4.  Ensure family/visitors adhere to safety recommendations  5.  Ensure safety tray has been added to patient's diet order  6.  Every shift and PRN: Re-assess suicidal risk via Frequent Screener    Outcome: Progressing     
  Problem: Risk for Elopement  Goal: Patient will not exit the unit/facility without proper excort  Outcome: Progressing     Problem: Pain  Goal: Verbalizes/displays adequate comfort level or baseline comfort level  Outcome: Progressing     Problem: Self Harm/Suicidality  Goal: Will have no self-injury during hospital stay  Description: INTERVENTIONS:  1.  Ensure constant observer at bedside with Q15M safety checks  2.  Maintain a safe environment  3.  Secure patient belongings  4.  Ensure family/visitors adhere to safety recommendations  5.  Ensure safety tray has been added to patient's diet order  6.  Every shift and PRN: Re-assess suicidal risk via Frequent Screener    Outcome: Progressing     Problem: Psychosis  Goal: Will report no hallucinations or delusions  Description: INTERVENTIONS:  1. Administer medication as  ordered  2. Assist with reality testing to support increasing orientation  3. Assess if patient's hallucinations or delusions are encouraging self harm or harm to others and intervene as appropriate  Outcome: Not Progressing     Problem: Drug Abuse/Detox  Goal: Will have no detox symptoms and will verbalize plan for changing drug-related behavior  Description: INTERVENTIONS:  1. Administer medication as ordered  2. Monitor physical status  3. Provide emotional support with 1:1 interaction with staff  4. Encourage  recovery focused treatment   Outcome: Progressing     Problem: Behavior  Goal: Pt/Family maintain appropriate behavior and adhere to behavioral management agreement, if implemented  Description: INTERVENTIONS:  1. Assess patient/family's coping skills and  non-compliant behavior (including use of illegal substances)  2. Notify security of behavior or suspected illegal substances which indicate the need for search of the family and/or belongings  3. Encourage verbalization of thoughts and concerns in a socially appropriate manner  4. Utilize positive, consistent limit setting strategies 
  Problem: Risk for Elopement  Goal: Patient will not exit the unit/facility without proper excort  Outcome: Progressing     Problem: Pain  Goal: Verbalizes/displays adequate comfort level or baseline comfort level  Outcome: Progressing     Problem: Self Harm/Suicidality  Goal: Will have no self-injury during hospital stay  Description: INTERVENTIONS:  1.  Ensure constant observer at bedside with Q15M safety checks  2.  Maintain a safe environment  3.  Secure patient belongings  4.  Ensure family/visitors adhere to safety recommendations  5.  Ensure safety tray has been added to patient's diet order  6.  Every shift and PRN: Re-assess suicidal risk via Frequent Screener    Outcome: Progressing   Patient has been labile and restless this shift.  Patient is observed heavily responding to internal stimuli.  Patient is confrontational and argumentative when he communicates with staff.  Patient told this writer \"Get the fuck out of my room,\" when asked about taking his morning medications.  Patient refused his vital signs and is very selective about his treatment and care.  Patient approached the nursing station and requested medication on his own free will.  PRN Haldol 5mg and Hydroxyzine 50 mg PO administered.  Will continue to provide a safe and therapeutic environment.  
  Problem: Risk for Elopement  Goal: Patient will not exit the unit/facility without proper excort  Outcome: Progressing     Problem: Pain  Goal: Verbalizes/displays adequate comfort level or baseline comfort level  Outcome: Progressing     Problem: Self Harm/Suicidality  Goal: Will have no self-injury during hospital stay  Description: INTERVENTIONS:  1.  Ensure constant observer at bedside with Q15M safety checks  2.  Maintain a safe environment  3.  Secure patient belongings  4.  Ensure family/visitors adhere to safety recommendations  5.  Ensure safety tray has been added to patient's diet order  6.  Every shift and PRN: Re-assess suicidal risk via Frequent Screener    Outcome: Progressing   Patient has been visible out in the milieu sitting alone responding to internal stimuli.  Patient is compliant with care.  He remains paranoid and guarded and easily agitated.  Patient received PRN Haldol 5 mg and Ativan 1 mg PO for anxiety and agitation.  Medication provided good effect.  Will continue to monitor efficacy.    
  Problem: Risk for Elopement  Goal: Patient will not exit the unit/facility without proper excort  Outcome: Progressing     Problem: Pain  Goal: Verbalizes/displays adequate comfort level or baseline comfort level  Outcome: Progressing     Problem: Self Harm/Suicidality  Goal: Will have no self-injury during hospital stay  Description: INTERVENTIONS:  1.  Ensure constant observer at bedside with Q15M safety checks  2.  Maintain a safe environment  3.  Secure patient belongings  4.  Ensure family/visitors adhere to safety recommendations  5.  Ensure safety tray has been added to patient's diet order  6.  Every shift and PRN: Re-assess suicidal risk via Frequent Screener    Outcome: Progressing   Patient presents calm and cooperative this shift.  Patient was agreeable to taking his scheduled medications and vital sign assessment without prompting.  Patient is observed responding to internal stimuli; however, patient is less labile and agitated.  Patient remains med/meal  compliant.  No behavioral issues noted.   
  Problem: Risk for Elopement  Goal: Patient will not exit the unit/facility without proper excort  Outcome: Progressing     Problem: Pain  Goal: Verbalizes/displays adequate comfort level or baseline comfort level  Outcome: Progressing     Problem: Self Harm/Suicidality  Goal: Will have no self-injury during hospital stay  Description: INTERVENTIONS:  1.  Ensure constant observer at bedside with Q15M safety checks  2.  Maintain a safe environment  3.  Secure patient belongings  4.  Ensure family/visitors adhere to safety recommendations  5.  Ensure safety tray has been added to patient's diet order  6.  Every shift and PRN: Re-assess suicidal risk via Frequent Screener    Outcome: Progressing   Patient presents disorganized and bizarre; frequently responding to internal stimuli.  Patient denies SI/HI and AVH.  Patient is med/meal compliant.  He refused his scheduled Depakote level.  No behavioral issues noted this shift.   
  Problem: Risk for Elopement  Goal: Patient will not exit the unit/facility without proper excort  Outcome: Progressing     Problem: Pain  Goal: Verbalizes/displays adequate comfort level or baseline comfort level  Outcome: Progressing     Problem: Self Harm/Suicidality  Goal: Will have no self-injury during hospital stay  Description: INTERVENTIONS:  1.  Ensure constant observer at bedside with Q15M safety checks  2.  Maintain a safe environment  3.  Secure patient belongings  4.  Ensure family/visitors adhere to safety recommendations  5.  Ensure safety tray has been added to patient's diet order  6.  Every shift and PRN: Re-assess suicidal risk via Frequent Screener    Outcome: Progressing   Patient slept until around noon today.  Patient responds heavily to internal stimuli.  He is disorganized, disheveled, unkempt, and suspicious.  Patient refused his lab draw for a Valproic Acid level.  Patient educated on the importance of having his labs drawn; however, patient refused.  No behavioral issues noted.   
  Problem: Self Harm/Suicidality  Goal: Will have no self-injury during hospital stay  Description: INTERVENTIONS:  1.  Ensure constant observer at bedside with Q15M safety checks  2.  Maintain a safe environment  3.  Secure patient belongings  4.  Ensure family/visitors adhere to safety recommendations  5.  Ensure safety tray has been added to patient's diet order  6.  Every shift and PRN: Re-assess suicidal risk via Frequent Screener    5/18/2024 2015 by Lyly Rajput RN  Flowsheets (Taken 5/17/2024 2227)  Will have no self-injury during hospital stay:   Ensure constant observer at bedside with Q15M safety checks   Secure patient belongings   Ensure safety tray has been added to patient's diet order   Every shift and PRN: Re-assess suicidal risk via Frequent Screener  5/18/2024 0858 by Radu Gould, RN  Outcome: Progressing     Problem: Behavior  Goal: Pt/Family maintain appropriate behavior and adhere to behavioral management agreement, if implemented  Description: INTERVENTIONS:  1. Assess patient/family's coping skills and  non-compliant behavior (including use of illegal substances)  2. Notify security of behavior or suspected illegal substances which indicate the need for search of the family and/or belongings  3. Encourage verbalization of thoughts and concerns in a socially appropriate manner  4. Utilize positive, consistent limit setting strategies supporting safety of patient, staff and others  5. Encourage participation in the decision making process about the behavioral management agreement  6. If a visitor's behavior poses a threat to safety call refer to organization policy.  7. Initiate consult with , Psychosocial CNS, Spiritual Care as appropriate  5/18/2024 0858 by Radu Gould, RN  Outcome: Progressing   Pt. Is visible in the milieu. He is responding to internal stimuli, can be redirected at times. He refused vital signs to be taken but complaint with his medications. 
  Problem: Self Harm/Suicidality  Goal: Will have no self-injury during hospital stay  Description: INTERVENTIONS:  1.  Ensure constant observer at bedside with Q15M safety checks  2.  Maintain a safe environment  3.  Secure patient belongings  4.  Ensure family/visitors adhere to safety recommendations  5.  Ensure safety tray has been added to patient's diet order  6.  Every shift and PRN: Re-assess suicidal risk via Frequent Screener    5/19/2024 0905 by Radu Gould, RN  Outcome: Progressing  5/18/2024 2015 by Lyly Rajput RN  Flowsheets (Taken 5/17/2024 2227)  Will have no self-injury during hospital stay:   Ensure constant observer at bedside with Q15M safety checks   Secure patient belongings   Ensure safety tray has been added to patient's diet order   Every shift and PRN: Re-assess suicidal risk via Frequent Screener     Problem: Psychosis  Goal: Will report no hallucinations or delusions  Description: INTERVENTIONS:  1. Administer medication as  ordered  2. Assist with reality testing to support increasing orientation  3. Assess if patient's hallucinations or delusions are encouraging self harm or harm to others and intervene as appropriate  Outcome: Progressing     Problem: Behavior  Goal: Pt/Family maintain appropriate behavior and adhere to behavioral management agreement, if implemented  Description: INTERVENTIONS:  1. Assess patient/family's coping skills and  non-compliant behavior (including use of illegal substances)  2. Notify security of behavior or suspected illegal substances which indicate the need for search of the family and/or belongings  3. Encourage verbalization of thoughts and concerns in a socially appropriate manner  4. Utilize positive, consistent limit setting strategies supporting safety of patient, staff and others  5. Encourage participation in the decision making process about the behavioral management agreement  6. If a visitor's behavior poses a threat to safety call 
  Problem: Self Harm/Suicidality  Goal: Will have no self-injury during hospital stay  Description: INTERVENTIONS:  1.  Ensure constant observer at bedside with Q15M safety checks  2.  Maintain a safe environment  3.  Secure patient belongings  4.  Ensure family/visitors adhere to safety recommendations  5.  Ensure safety tray has been added to patient's diet order  6.  Every shift and PRN: Re-assess suicidal risk via Frequent Screener    5/19/2024 1945 by Lyly Rajput RN  Flowsheets (Taken 5/17/2024 2227)  Will have no self-injury during hospital stay:   Ensure constant observer at bedside with Q15M safety checks   Secure patient belongings   Ensure safety tray has been added to patient's diet order   Every shift and PRN: Re-assess suicidal risk via Frequent Screener  5/19/2024 0905 by Radu Gould RN  Outcome: Progressing     Problem: Psychosis  Goal: Will report no hallucinations or delusions  Description: INTERVENTIONS:  1. Administer medication as  ordered  2. Assist with reality testing to support increasing orientation  3. Assess if patient's hallucinations or delusions are encouraging self harm or harm to others and intervene as appropriate  5/19/2024 0905 by Radu Gould RN  Outcome: Progressing     Problem: Behavior  Goal: Pt/Family maintain appropriate behavior and adhere to behavioral management agreement, if implemented  Description: INTERVENTIONS:  1. Assess patient/family's coping skills and  non-compliant behavior (including use of illegal substances)  2. Notify security of behavior or suspected illegal substances which indicate the need for search of the family and/or belongings  3. Encourage verbalization of thoughts and concerns in a socially appropriate manner  4. Utilize positive, consistent limit setting strategies supporting safety of patient, staff and others  5. Encourage participation in the decision making process about the behavioral management agreement  6. If a 
  Problem: Self Harm/Suicidality  Goal: Will have no self-injury during hospital stay  Description: INTERVENTIONS:  1.  Ensure constant observer at bedside with Q15M safety checks  2.  Maintain a safe environment  3.  Secure patient belongings  4.  Ensure family/visitors adhere to safety recommendations  5.  Ensure safety tray has been added to patient's diet order  6.  Every shift and PRN: Re-assess suicidal risk via Frequent Screener    5/21/2024 2120 by Vanessa Hernandez RN  Outcome: Progressing  Flowsheets (Taken 5/21/2024 2120)  Will have no self-injury during hospital stay:   Every shift and PRN: Re-assess suicidal risk via Frequent Screener   Maintain a safe environment     2120- pt has been visible on unit in day area and nurse's station responding to internal stimuli, loud/pressured tone of voice, intrusive, and labile.  All needs assessed and met.  Denied si/hi during time of assessment.  Compliant with meds and prn meds provided on symptoms observed.  Will continue to monitor and support as needed.   
  Problem: Self Harm/Suicidality  Goal: Will have no self-injury during hospital stay  Description: INTERVENTIONS:  1.  Ensure constant observer at bedside with Q15M safety checks  2.  Maintain a safe environment  3.  Secure patient belongings  4.  Ensure family/visitors adhere to safety recommendations  5.  Ensure safety tray has been added to patient's diet order  6.  Every shift and PRN: Re-assess suicidal risk via Frequent Screener    5/28/2024 1006 by Grace Mckeon RN  Outcome: Progressing  5/27/2024 2205 by Josie Mathew RN  Outcome: Progressing     Problem: Drug Abuse/Detox  Goal: Will have no detox symptoms and will verbalize plan for changing drug-related behavior  Description: INTERVENTIONS:  1. Administer medication as ordered  2. Monitor physical status  3. Provide emotional support with 1:1 interaction with staff  4. Encourage  recovery focused treatment   5/27/2024 2205 by Josie Mathew RN  Outcome: Progressing     Problem: Psychosis  Goal: Will report no hallucinations or delusions  Description: INTERVENTIONS:  1. Administer medication as  ordered  2. Assist with reality testing to support increasing orientation  3. Assess if patient's hallucinations or delusions are encouraging self harm or harm to others and intervene as appropriate  5/27/2024 2205 by Josie Mathew RN  Outcome: Not Progressing     Pt presents with dull affect, labile mood, disorganized thought process, paranoia, delusions of grandeur, intrusive behaviors. Pt appearing to be responding to internal stimuli. Pt denies SI/HI/AVH. Pt is medication compliant.   
  Problem: Self Harm/Suicidality  Goal: Will have no self-injury during hospital stay  Description: INTERVENTIONS:  1.  Ensure constant observer at bedside with Q15M safety checks  2.  Maintain a safe environment  3.  Secure patient belongings  4.  Ensure family/visitors adhere to safety recommendations  5.  Ensure safety tray has been added to patient's diet order  6.  Every shift and PRN: Re-assess suicidal risk via Frequent Screener    6/2/2024 0953 by Bety Jensen RN  Outcome: Progressing  6/1/2024 2023 by Lyly Rajput RN  Flowsheets (Taken 5/21/2024 2120 by Vanessa Hernandez RN)  Will have no self-injury during hospital stay:   Every shift and PRN: Re-assess suicidal risk via Frequent Screener   Maintain a safe environment     Problem: Psychosis  Goal: Will report no hallucinations or delusions  Description: INTERVENTIONS:  1. Administer medication as  ordered  2. Assist with reality testing to support increasing orientation  3. Assess if patient's hallucinations or delusions are encouraging self harm or harm to others and intervene as appropriate  Outcome: Progressing    Patient has been isolative this shift. Patient VS stable. Meal and medication compliant. Patient denies SI, AVH, HI, pain, depression and anxiety. Patient still responding to internal stimuli. Patient asked this writer \"What is the cheapest hotel in [place]?\", this writer replied \"[facility] for $53.00  a night.\" Patient replied \"thank you\" and walked off. Patient mood is paranoid with congruent affect. Patient refused labs this morning, MD aware. Will continue to monitor for ongoing care.      
  Problem: Self Harm/Suicidality  Goal: Will have no self-injury during hospital stay  Description: INTERVENTIONS:  1.  Ensure constant observer at bedside with Q15M safety checks  2.  Maintain a safe environment  3.  Secure patient belongings  4.  Ensure family/visitors adhere to safety recommendations  5.  Ensure safety tray has been added to patient's diet order  6.  Every shift and PRN: Re-assess suicidal risk via Frequent Screener    6/2/2024 1936 by Lyly Rajput RN  Flowsheets (Taken 5/21/2024 2120 by Vanessa Hernandez RN)  Will have no self-injury during hospital stay:   Every shift and PRN: Re-assess suicidal risk via Frequent Screener   Maintain a safe environment  6/2/2024 0953 by Bety Jensen RN  Outcome: Progressing     Problem: Psychosis  Goal: Will report no hallucinations or delusions  Description: INTERVENTIONS:  1. Administer medication as  ordered  2. Assist with reality testing to support increasing orientation  3. Assess if patient's hallucinations or delusions are encouraging self harm or harm to others and intervene as appropriate  6/2/2024 0953 by Bety Jensen, RN  Outcome: Progressing   Pt. Is isolative to his room this evening. He is compliant with his medications. He is free from falls, self harm or harming others.  
  Problem: Self Harm/Suicidality  Goal: Will have no self-injury during hospital stay  Description: INTERVENTIONS:  1.  Ensure constant observer at bedside with Q15M safety checks  2.  Maintain a safe environment  3.  Secure patient belongings  4.  Ensure family/visitors adhere to safety recommendations  5.  Ensure safety tray has been added to patient's diet order  6.  Every shift and PRN: Re-assess suicidal risk via Frequent Screener    6/3/2024 1414 by Grace Mckeon RN  Outcome: Progressing  6/3/2024 1341 by Grace Mckeon RN  Outcome: Progressing     Problem: Psychosis  Goal: Will report no hallucinations or delusions  Description: INTERVENTIONS:  1. Administer medication as  ordered  2. Assist with reality testing to support increasing orientation  3. Assess if patient's hallucinations or delusions are encouraging self harm or harm to others and intervene as appropriate  6/3/2024 1414 by Grace Mckeon RN  Outcome: Progressing  6/3/2024 1341 by Grace Mckeon RN  Outcome: Progressing     Problem: Drug Abuse/Detox  Goal: Will have no detox symptoms and will verbalize plan for changing drug-related behavior  Description: INTERVENTIONS:  1. Administer medication as ordered  2. Monitor physical status  3. Provide emotional support with 1:1 interaction with staff  4. Encourage  recovery focused treatment   6/3/2024 1414 by Grace Mckeon RN  Outcome: Progressing  6/3/2024 1341 by Grace Mckeon RN  Outcome: Progressing     Pt presents with dull affect, labile mood, disorganized thought process, paranoia. Pt has been withdrawn to self on the unit, but attends select groups. Pt continues to appear to be responding to internal stimuli. Pt denies SI/HI/AVH. Pt is medication compliant.        
  Problem: Self Harm/Suicidality  Goal: Will have no self-injury during hospital stay  Description: INTERVENTIONS:  1.  Ensure constant observer at bedside with Q15M safety checks  2.  Maintain a safe environment  3.  Secure patient belongings  4.  Ensure family/visitors adhere to safety recommendations  5.  Ensure safety tray has been added to patient's diet order  6.  Every shift and PRN: Re-assess suicidal risk via Frequent Screener    6/3/2024 2004 by Lyly Rajput RN  Flowsheets (Taken 5/21/2024 2120 by Vanessa Hernandez RN)  Will have no self-injury during hospital stay:   Every shift and PRN: Re-assess suicidal risk via Frequent Screener   Maintain a safe environment  6/3/2024 1414 by Grace Mckeon RN  Outcome: Progressing  6/3/2024 1341 by Grace Mckeon RN  Outcome: Progressing     Problem: Psychosis  Goal: Will report no hallucinations or delusions  Description: INTERVENTIONS:  1. Administer medication as  ordered  2. Assist with reality testing to support increasing orientation  3. Assess if patient's hallucinations or delusions are encouraging self harm or harm to others and intervene as appropriate  6/3/2024 1414 by Grace Mckeon RN  Outcome: Progressing  6/3/2024 1341 by Grace Mckeon RN  Outcome: Progressing   Pt. Is loose and disorganized. No aggressive behavior noted. He responds to internal stimuli. He is free from falls, self harm or harming others.  
Problem: Risk for Elopement  Goal: Patient will not exit the unit/facility without proper excort  Outcome: Progressing   Problem: Self Harm/Suicidality  Goal: Will have no self-injury during hospital stay  Description: INTERVENTIONS:  1.  Ensure constant observer at bedside with Q15M safety checks  2.  Maintain a safe environment  3.  Secure patient belongings  4.  Ensure family/visitors adhere to safety recommendations  5.  Ensure safety tray has been added to patient's diet order  6.  Every shift and PRN: Re-assess suicidal risk via Frequent Screener  Outcome: Progressing   Problem: Psychosis  Goal: Will report no hallucinations or delusions  Description: INTERVENTIONS:  1. Administer medication as  ordered  2. Assist with reality testing to support increasing orientation  3. Assess if patient's hallucinations or delusions are encouraging self harm or harm to others and intervene as appropriate  Outcome: Progressing   Problem: Drug Abuse/Detox  Goal: Will have no detox symptoms and will verbalize plan for changing drug-related behavior  Description: INTERVENTIONS:  1. Administer medication as ordered  2. Monitor physical status  3. Provide emotional support with 1:1 interaction with staff  4. Encourage  recovery focused treatment   Outcome: Progressing   Teddy has been visible in the milieu, behavior guarded but cooperative, Paranoid delusions observed \"the [employer] has cameras all over watching me\" Noted to be RIS \"talking to self,\" Denied SI/HI. Appeared to be having AVH. Thought process disorganized with flight of idea and tangentiality. Medication compliant with scheduled and Prn meds as needed. Appetite good, Denied issues with sleep and eliminations. Sat in group but attention span very short. Will continue to redirect, support, monitor, reorient with realty and follow plan of care. Safety checks ongoing.  
Problem: Self Harm/Suicidality  Goal: Will have no self-injury during hospital stay  Description: INTERVENTIONS:  1.  Ensure constant observer at bedside with Q15M safety checks  2.  Maintain a safe environment  3.  Secure patient belongings  4.  Ensure family/visitors adhere to safety recommendations  5.  Ensure safety tray has been added to patient's diet order  6.  Every shift and PRN: Re-assess suicidal risk via Frequent Screener    5/26/2024 1110 by Grace Mckeon RN  Outcome: Progressing     Problem: Pain  Goal: Verbalizes/displays adequate comfort level or baseline comfort level  5/26/2024 1110 by Grace Mckeon, RN  Outcome: Progressing     Problem: Psychosis  Goal: Will report no hallucinations or delusions  Description: INTERVENTIONS:  1. Administer medication as  ordered  2. Assist with reality testing to support increasing orientation  3. Assess if patient's hallucinations or delusions are encouraging self harm or harm to others and intervene as appropriate  Outcome: Progressing     Pt presents with flat affect, labile mood, flight of ideas, delusions of grandeur, paranoia. Pt has been withdrawn to self on the unit, but does periodically come out of his room appearing to be responding to internal stimuli. Pt heard arguing to himself in his room. Pt denies SI/HI/AVH. Pt is medication compliant.   
1131)  Patient/family maintains appropriate behavior and adheres to behavioral management agreement, if implemented:   Utilize positive, consistent limit setting strategies supporting safety of patient, staff and others   Encourage verbalization of thoughts and concerns in a socially appropriate manner  Pt calm/cooperative. Pt cooperative with scheduled medications. Denies SI/HI. Pt endorses AH. No behaviors/aggression encountered at present. Therapeutic interactions/communication, safety checks, monitoring and plan of care ongoing.  
allowed staff to get his hs vital signs.  Will continue to monitor/support  
belongings  3. Encourage verbalization of thoughts and concerns in a socially appropriate manner  4. Utilize positive, consistent limit setting strategies supporting safety of patient, staff and others  5. Encourage participation in the decision making process about the behavioral management agreement  6. If a visitor's behavior poses a threat to safety call refer to organization policy.  7. Initiate consult with , Psychosocial CNS, Spiritual Care as appropriate  Outcome: Not Progressing       Has been disruptive on the unit and has needed frequent redirection d/t his disruptive behaviors, mainly verbally.  Pt has been verbally loud and deragatory language.  Mildly responsive to staff redirection.  Was given prn Haldol 5 mg po and cogentin 1 mg for psychosis.  Was compliant w/ hs scheduled medications and ate hs snack.  Denies si/hi.  Will continue to monitor/support  
continue to monitor for safety.   
presents with a blunted affect; loose and disorganized thought process and appears disheveled and malodorous. Pt was encouraged to shower. Pt denies any SI/HI. Pt noted to responding to internal stimuli as pt would talk, yell and laugh to self. Pt is eating and toileting well but has difficulty sleeping. Pt is compliant with medications. Stressed the importance of medication compliance and positive coping skills; encouraged pt to talk to staff in case pt feels anxious or agitated before the situation gets out of hand; pt nodded in understanding. No aggression noted at this time. Will continue to monitor pt.

## 2024-06-04 NOTE — DISCHARGE INSTRUCTIONS
Outpt services with:   [facility]  [address] [place] [address]  # [phone]  Emergency Hotline: #[phone]

## 2024-07-16 ENCOUNTER — HOSPITAL ENCOUNTER (EMERGENCY)
Facility: HOSPITAL | Age: 34
Discharge: LEFT BEFORE DISCHARGE | End: 2024-07-16
Attending: EMERGENCY MEDICINE
Payer: COMMERCIAL

## 2024-07-16 VITALS
BODY MASS INDEX: 20.32 KG/M2 | TEMPERATURE: 97.8 F | RESPIRATION RATE: 18 BRPM | DIASTOLIC BLOOD PRESSURE: 84 MMHG | WEIGHT: 150 LBS | OXYGEN SATURATION: 94 % | HEIGHT: 72 IN | SYSTOLIC BLOOD PRESSURE: 147 MMHG | HEART RATE: 100 BPM

## 2024-07-16 DIAGNOSIS — F23 ACUTE PSYCHOSIS (HCC): Primary | ICD-10-CM

## 2024-07-16 DIAGNOSIS — F19.10 SUBSTANCE ABUSE (HCC): ICD-10-CM

## 2024-07-16 DIAGNOSIS — F25.0 SCHIZOAFFECTIVE DISORDER, BIPOLAR TYPE (HCC): ICD-10-CM

## 2024-07-16 LAB
ALBUMIN SERPL-MCNC: 4.1 G/DL (ref 3.4–5)
ALBUMIN/GLOB SERPL: 1.2 (ref 0.8–1.7)
ALP SERPL-CCNC: 73 U/L (ref 45–117)
ALT SERPL-CCNC: 27 U/L (ref 16–61)
ANION GAP SERPL CALC-SCNC: 6 MMOL/L (ref 3–18)
AST SERPL-CCNC: 15 U/L (ref 10–38)
BASOPHILS # BLD: 0 K/UL (ref 0–0.1)
BASOPHILS NFR BLD: 0 % (ref 0–2)
BILIRUB SERPL-MCNC: 0.4 MG/DL (ref 0.2–1)
BUN SERPL-MCNC: 9 MG/DL (ref 7–18)
BUN/CREAT SERPL: 10 (ref 12–20)
CALCIUM SERPL-MCNC: 9.4 MG/DL (ref 8.5–10.1)
CHLORIDE SERPL-SCNC: 106 MMOL/L (ref 100–111)
CO2 SERPL-SCNC: 25 MMOL/L (ref 21–32)
CREAT SERPL-MCNC: 0.93 MG/DL (ref 0.6–1.3)
DIFFERENTIAL METHOD BLD: ABNORMAL
EOSINOPHIL # BLD: 0.1 K/UL (ref 0–0.4)
EOSINOPHIL NFR BLD: 1 % (ref 0–5)
ERYTHROCYTE [DISTWIDTH] IN BLOOD BY AUTOMATED COUNT: 13.4 % (ref 11.6–14.5)
ETHANOL SERPL-MCNC: <3 MG/DL (ref 0–3)
FLUAV RNA SPEC QL NAA+PROBE: NOT DETECTED
FLUBV RNA SPEC QL NAA+PROBE: NOT DETECTED
GLOBULIN SER CALC-MCNC: 3.4 G/DL (ref 2–4)
GLUCOSE SERPL-MCNC: 79 MG/DL (ref 74–99)
HCT VFR BLD AUTO: 48.4 % (ref 36–48)
HGB BLD-MCNC: 16.1 G/DL (ref 13–16)
IMM GRANULOCYTES # BLD AUTO: 0 K/UL (ref 0–0.04)
IMM GRANULOCYTES NFR BLD AUTO: 0 % (ref 0–0.5)
LYMPHOCYTES # BLD: 2.8 K/UL (ref 0.9–3.6)
LYMPHOCYTES NFR BLD: 36 % (ref 21–52)
MCH RBC QN AUTO: 28 PG (ref 24–34)
MCHC RBC AUTO-ENTMCNC: 33.3 G/DL (ref 31–37)
MCV RBC AUTO: 84.3 FL (ref 78–100)
MONOCYTES # BLD: 0.8 K/UL (ref 0.05–1.2)
MONOCYTES NFR BLD: 10 % (ref 3–10)
NEUTS SEG # BLD: 4.1 K/UL (ref 1.8–8)
NEUTS SEG NFR BLD: 53 % (ref 40–73)
NRBC # BLD: 0 K/UL (ref 0–0.01)
NRBC BLD-RTO: 0 PER 100 WBC
PLATELET # BLD AUTO: 253 K/UL (ref 135–420)
PLATELET COMMENT: ABNORMAL
PMV BLD AUTO: 10.7 FL (ref 9.2–11.8)
POTASSIUM SERPL-SCNC: 4 MMOL/L (ref 3.5–5.5)
PROT SERPL-MCNC: 7.5 G/DL (ref 6.4–8.2)
RBC # BLD AUTO: 5.74 M/UL (ref 4.35–5.65)
RBC MORPH BLD: ABNORMAL
SARS-COV-2 RNA RESP QL NAA+PROBE: NOT DETECTED
SODIUM SERPL-SCNC: 137 MMOL/L (ref 136–145)
WBC # BLD AUTO: 7.8 K/UL (ref 4.6–13.2)

## 2024-07-16 PROCEDURE — 85025 COMPLETE CBC W/AUTO DIFF WBC: CPT

## 2024-07-16 PROCEDURE — 80053 COMPREHEN METABOLIC PANEL: CPT

## 2024-07-16 PROCEDURE — 87636 SARSCOV2 & INF A&B AMP PRB: CPT

## 2024-07-16 PROCEDURE — 99283 EMERGENCY DEPT VISIT LOW MDM: CPT

## 2024-07-16 PROCEDURE — 82077 ASSAY SPEC XCP UR&BREATH IA: CPT

## 2024-07-16 PROCEDURE — 90791 PSYCH DIAGNOSTIC EVALUATION: CPT | Performed by: SOCIAL WORKER

## 2024-07-16 ASSESSMENT — PAIN - FUNCTIONAL ASSESSMENT: PAIN_FUNCTIONAL_ASSESSMENT: NONE - DENIES PAIN

## 2024-07-16 NOTE — ED PROVIDER NOTES
MD Sheila    Diagnosis and Disposition     I Mulu Sosa MD am the primary clinician of record.        DIAGNOSIS:   1. Acute psychosis (HCC)    2. Schizoaffective disorder, bipolar type (HCC)    3. Substance abuse (HCC)        DISPOSITION        PATIENT REFERRED TO:  No follow-up provider specified.    DISCHARGE MEDICATIONS:  New Prescriptions    No medications on file       DISCONTINUED MEDICATIONS:  Discontinued Medications    No medications on file              (Please note that portions of this note were completed with a voice recognition program.  Efforts were made to edit the dictations but occasionally words are mis-transcribed.)    Mulu Sosa MD (electronically signed)        Dragon Disclaimer     Please note that this dictation was completed with [employer] the computer voice recognition software.  Quite often unanticipated grammatical, syntax, homophones, and other interpretive errors are inadvertently transcribed by the computer software.  Please disregard these errors.  Please excuse any errors that have escaped final proofreading.        Mulu Sosa MD  07/17/24 9267
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
## 2024-07-16 NOTE — BSMART NOTE
Crisis note:    Went to the ER to meet with patient. Patient assigned H6/F, patient was not located at his assigned bed placement in the ER. A sitter that was with another patient informed she believes that patient has left.   Dr. Sosa in the ER informed of the above.

## 2024-07-16 NOTE — ED NOTES
Pt left ER. States his worker left with his phone, and lied to him. Pt let MITCHELL IRIZARRY made aware
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
## 2024-07-16 NOTE — ED NOTES
Pt came in voluntarily and cooperative. Patient states he wants help but refusing to put on blue scrubs and socks. Patient advised of the procedures if once medically cleared if need to be further evaluated to be changed out and verbalized understanding. Patient wanded by security while in triage and no harmful things found on patient. Patient counselor at bedside with patient. Covid and labs sent to lab and patient given urine cup.

## 2024-08-04 ENCOUNTER — HOSPITAL ENCOUNTER (EMERGENCY)
Facility: HOSPITAL | Age: 34
Discharge: PSYCHIATRIC OR SUBSTANCE USE FACILITY | End: 2024-08-09
Attending: STUDENT IN AN ORGANIZED HEALTH CARE EDUCATION/TRAINING PROGRAM
Payer: COMMERCIAL

## 2024-08-04 DIAGNOSIS — F29 PSYCHOSIS, UNSPECIFIED PSYCHOSIS TYPE (HCC): Primary | ICD-10-CM

## 2024-08-04 LAB
ALBUMIN SERPL-MCNC: 3.5 G/DL (ref 3.4–5)
ALBUMIN/GLOB SERPL: 1.1 (ref 0.8–1.7)
ALP SERPL-CCNC: 55 U/L (ref 45–117)
ALT SERPL-CCNC: 21 U/L (ref 16–61)
ANION GAP SERPL CALC-SCNC: 7 MMOL/L (ref 3–18)
AST SERPL-CCNC: 14 U/L (ref 10–38)
BASOPHILS # BLD: 0.1 K/UL (ref 0–0.1)
BASOPHILS NFR BLD: 1 % (ref 0–2)
BILIRUB SERPL-MCNC: 0.3 MG/DL (ref 0.2–1)
BUN SERPL-MCNC: 13 MG/DL (ref 7–18)
BUN/CREAT SERPL: 13 (ref 12–20)
CALCIUM SERPL-MCNC: 8.8 MG/DL (ref 8.5–10.1)
CHLORIDE SERPL-SCNC: 107 MMOL/L (ref 100–111)
CO2 SERPL-SCNC: 25 MMOL/L (ref 21–32)
CREAT SERPL-MCNC: 1.03 MG/DL (ref 0.6–1.3)
DIFFERENTIAL METHOD BLD: NORMAL
EOSINOPHIL # BLD: 0.2 K/UL (ref 0–0.4)
EOSINOPHIL NFR BLD: 2 % (ref 0–5)
ERYTHROCYTE [DISTWIDTH] IN BLOOD BY AUTOMATED COUNT: 13.3 % (ref 11.6–14.5)
ETHANOL SERPL-MCNC: <3 MG/DL (ref 0–3)
GLOBULIN SER CALC-MCNC: 3.2 G/DL (ref 2–4)
GLUCOSE SERPL-MCNC: 85 MG/DL (ref 74–99)
HCT VFR BLD AUTO: 46.3 % (ref 36–48)
HGB BLD-MCNC: 15.1 G/DL (ref 13–16)
IMM GRANULOCYTES # BLD AUTO: 0 K/UL (ref 0–0.04)
IMM GRANULOCYTES NFR BLD AUTO: 0 % (ref 0–0.5)
LYMPHOCYTES # BLD: 3.4 K/UL (ref 0.9–3.6)
LYMPHOCYTES NFR BLD: 32 % (ref 21–52)
MCH RBC QN AUTO: 27.8 PG (ref 24–34)
MCHC RBC AUTO-ENTMCNC: 32.6 G/DL (ref 31–37)
MCV RBC AUTO: 85.1 FL (ref 78–100)
MONOCYTES # BLD: 0.9 K/UL (ref 0.05–1.2)
MONOCYTES NFR BLD: 9 % (ref 3–10)
NEUTS SEG # BLD: 6.2 K/UL (ref 1.8–8)
NEUTS SEG NFR BLD: 57 % (ref 40–73)
NRBC # BLD: 0 K/UL (ref 0–0.01)
NRBC BLD-RTO: 0 PER 100 WBC
PLATELET # BLD AUTO: 208 K/UL (ref 135–420)
PMV BLD AUTO: 11.2 FL (ref 9.2–11.8)
POTASSIUM SERPL-SCNC: 3.7 MMOL/L (ref 3.5–5.5)
PROT SERPL-MCNC: 6.7 G/DL (ref 6.4–8.2)
RBC # BLD AUTO: 5.44 M/UL (ref 4.35–5.65)
SODIUM SERPL-SCNC: 139 MMOL/L (ref 136–145)
WBC # BLD AUTO: 10.7 K/UL (ref 4.6–13.2)

## 2024-08-04 PROCEDURE — 82077 ASSAY SPEC XCP UR&BREATH IA: CPT

## 2024-08-04 PROCEDURE — 80053 COMPREHEN METABOLIC PANEL: CPT

## 2024-08-04 PROCEDURE — 99285 EMERGENCY DEPT VISIT HI MDM: CPT

## 2024-08-04 PROCEDURE — 85025 COMPLETE CBC W/AUTO DIFF WBC: CPT

## 2024-08-04 PROCEDURE — 90792 PSYCH DIAG EVAL W/MED SRVCS: CPT | Performed by: NURSE PRACTITIONER

## 2024-08-04 PROCEDURE — 94761 N-INVAS EAR/PLS OXIMETRY MLT: CPT

## 2024-08-04 ASSESSMENT — LIFESTYLE VARIABLES
HOW OFTEN DO YOU HAVE A DRINK CONTAINING ALCOHOL: NEVER
HOW MANY STANDARD DRINKS CONTAINING ALCOHOL DO YOU HAVE ON A TYPICAL DAY: PATIENT DOES NOT DRINK

## 2024-08-04 NOTE — ED NOTES
Pt refusing to allow this RN to draw his labs because he is \"scared of needles.\" Dr. Madden at bedside. Pt telling Dr. Madden \"I don't want my blood drawn. I can give urine to check for narcotics, but I ain't letting anyone draw blood. You can't force me. That's against my 10th amendment.\"

## 2024-08-04 NOTE — ED TRIAGE NOTES
Pt in ED via [employer] as an ECO. Pts mother went to  stating that pt was talking about killing people, blocking her from leaving the room, and trying to physically fight his hallucinations. Pt has a hx of schizophrenia. Pts mother also stated that pt will not bathe. Pt on phone with [facility] at this time and does not appear to be in any distress.  at bedside.

## 2024-08-05 LAB
AMPHET UR QL SCN: NEGATIVE
BARBITURATES UR QL SCN: NEGATIVE
BENZODIAZ UR QL: POSITIVE
CANNABINOIDS UR QL SCN: NEGATIVE
COCAINE UR QL SCN: NEGATIVE
Lab: ABNORMAL
METHADONE UR QL: NEGATIVE
OPIATES UR QL: POSITIVE
PCP UR QL: NEGATIVE

## 2024-08-05 PROCEDURE — 6370000000 HC RX 637 (ALT 250 FOR IP): Performed by: STUDENT IN AN ORGANIZED HEALTH CARE EDUCATION/TRAINING PROGRAM

## 2024-08-05 PROCEDURE — 80307 DRUG TEST PRSMV CHEM ANLYZR: CPT

## 2024-08-05 PROCEDURE — 94761 N-INVAS EAR/PLS OXIMETRY MLT: CPT

## 2024-08-05 RX ORDER — LORAZEPAM 1 MG/1
2 TABLET ORAL
Status: COMPLETED | OUTPATIENT
Start: 2024-08-05 | End: 2024-08-05

## 2024-08-05 RX ORDER — OLANZAPINE 5 MG/1
10 TABLET, ORALLY DISINTEGRATING ORAL ONCE
Status: DISCONTINUED | OUTPATIENT
Start: 2024-08-05 | End: 2024-08-07

## 2024-08-05 RX ADMIN — LORAZEPAM 2 MG: 1 TABLET ORAL at 13:55

## 2024-08-05 NOTE — ED NOTES
Pt noted to be speaking to self, getting agitated. RN entered room, asking pot if he was okay and if he needed anything. Pt said \"I dont need shit, get the fuck out of my room\"     PD at bedside.

## 2024-08-05 NOTE — BSMART NOTE
Crisis Note: DASH Hickey, informed this writer that the TDO will be supported on patient; will assist as needed.

## 2024-08-05 NOTE — ED NOTES
Patient still awake and talking, to himself in no acute distress cuffed to bed via left wrist.  Police in attendance for TDO.

## 2024-08-05 NOTE — VIRTUAL HEALTH
Teddy Moore, was evaluated through a synchronous (real-time) audio-video encounter. The patient (and/or guardian if applicable) is aware that this is a billable service, which includes applicable co-pays. This virtual visit was conducted with patient's (and/or legal guardian's) consent. Patient identification was verified, and a caregiver was present when appropriate.  The patient was located at Facility (Appt Department): [facility] EMERGENCY DEPT  [address] [address]  Loc: [phone]  The provider was located at Home (City/State): [place]  Confirm you are appropriately licensed, registered, or certified to deliver care in the state where the patient is located as indicated above. If you are not or unsure, please re-schedule the visit: Yes, I confirm.   [place] Consult to Tele-Psych  Consult performed by: Ric Martinez, JULIO - CNP  Consult ordered by: Jordy Madden MD                 Teddy Moore  [id number]  1990     EMERGENCY DEPARTMENT TELEPSYCHIATRY EVALUATION    08/04/24    Chief Complaint: Psychosis    Per Record:  Pt in ED via [employer] as an ECO. Pts mother went to [employer] stating that pt was talking about killing people, blocking her from leaving the room, and trying to physically fight his hallucinations. Pt has a hx of schizophrenia. Pts mother also stated that pt will not bathe.     Pt refusing to allow this RN to draw his labs because he is \"scared of needles.\" Dr. Madden at bedside. Pt telling Dr. Madden \"I don't want my blood drawn. I can give urine to check for narcotics, but I ain't letting anyone draw blood. You can't force me. That's against my 10th amendment.\"         HPI:   Patient is a 33 y.o.  male who presents for psychosis. Patient presented to the ED on 08/04/24 via police and is currently on a TDO.  Patient has a past psychiatric history of schizoaffective disorder, salvatore, aggressive outbursts, and substance use

## 2024-08-05 NOTE — BSMART NOTE
Chief Complaint   Patient presents with    Mental Health Problem     Patient was evaluated by Tele-Psych who recommends inpatient psychiatric treatment for  state of psychosis.    Patient is not voluntary inpatient treatment.    Past Medical History:   Diagnosis Date    Aggressive outburst     Nina (HCC)     Schizophrenia (HCC)     Substance abuse (HCC)      Prior to Visit Medications    Medication Sig Taking? Authorizing Provider   divalproex (DEPAKOTE) 250 MG DR tablet Take 3 tablets by mouth 2 times daily For a total of 750 mg twice daily.  Jose Luis Martinez MD   traZODone (DESYREL) 150 MG tablet Take 1 tablet by mouth nightly as needed for Sleep  Jose Luis Martinez MD   OLANZapine zydis (ZYPREXA) 10 MG disintegrating tablet Take 1 tablet by mouth daily  Jose Luis Martinez MD   OLANZapine zydis (ZYPREXA) 15 MG disintegrating tablet Take 1 tablet by mouth nightly  Jose Luis Martinez MD   benztropine (COGENTIN) 1 MG tablet Take 1 tablet by mouth daily  Jose Luis Martinez MD     The following labs and vitals were reviewed with on-call psychiatrist. Patient is TDO'd for [facility] on Wednesday.    CMP, CBC, ETOH, and Travel Screen;  awaiting UDS    Vitals:    08/04/24 1830   BP: 125/77   Pulse: 79   Resp: 20   Temp: 97 °F (36.1 °C)   SpO2: 98%     Writer met with patient to assess the following    Ambulation - Patient reports ability to ambulate without difficulty or the use of any assistive devices. and Patient denies any recent falls in the past three months .     ADLs - Patient able to perform own ADLs without assistance. Per TDO petition, patient has not been bathing.    DME - Patient requires none.    In consultation with on call provider [facility] provider Nayla Zaragoza NP, [phone] . Patient has been declined due to aggressive/violent behavior. [facility] to find placement. Patient has history of being aggressive towards staff and peers. [facility] [phone], made aware of need for bed search.    Awaiting UDS results;

## 2024-08-05 NOTE — ED NOTES
Pt resting in bed with eyes closed. Respirations are even, equal and unlabored. No signs of cardiopulmonary distress noted. PD at bedside     Zyprexa held at this time as pt is sleeping

## 2024-08-05 NOTE — ED NOTES
Received from out going nurse patient is awake and alert talking to unseen individual or individuals is calm karin held on a TDO cuffed to bed via left wrist skin intact.

## 2024-08-05 NOTE — BSMART NOTE
Crisis Note: Spoke with the on-call psychiatrist, Dr. Martinez. Patient has been declined due to aggressive/violent behavior. Patient has history of being aggressive towards staff and peers. [facility] assisting with bed search; crisis will assist as needed.

## 2024-08-05 NOTE — ED NOTES
Pt NAD voiced or noted. PD at bedside. Respirations are even, equal and unlabored. Pt awake and speaking to self. Pt demeanor is calm. PD at doorway

## 2024-08-05 NOTE — ED NOTES
Pt laying on stretcher, speaking to self. PO zyprexa ordered; [facility]; RN called Pharmacy, pharmacy is checking to make sure the med is on their next run down to the ED occurring shortly

## 2024-08-05 NOTE — BSMART NOTE
Crisis Note: [facility] [phone], informed that she needed clinicals to faxed to her for placement.   Discontinue Regimen: mupirocin 2 % topical ointment \\nSig: Apply to open skin BID PRN Continue Regimen: triamcinolone acetonide 0.025 % topical ointment \\nSig: Apply to rashes on body BID PRN Detail Level: Zone

## 2024-08-05 NOTE — ED NOTES
Report received from phu ji    Pt resting in bed with eyes closed. Respirations are even, equal and unlabored. No signs of cardiopulmonary distress noted.

## 2024-08-06 PROCEDURE — 94761 N-INVAS EAR/PLS OXIMETRY MLT: CPT

## 2024-08-06 PROCEDURE — 90791 PSYCH DIAGNOSTIC EVALUATION: CPT | Performed by: SOCIAL WORKER

## 2024-08-06 NOTE — VIRTUAL HEALTH
Telepsych  Crisis 24-Hour Reassessment       C-SSRS Screening Completed:   Completed but pt unable to fully participate     Current Suicide Risk (Low, Moderate, High):   LOW    Mental Status Exam:     Sensorium  disoriented   Orientation day of week   Relations unreliable   Eye Contact poor   Appearance:  age appropriate   Motor Behavior:  restless   Speech:  pressured   Vocabulary below average   Thought Process: flight of ideas and loose associations   Thought Content delusions and hallucinations   Suicidal ideations none   Homicidal ideations none   Mood:  \"Ok\"   Affect:  normal and anxious    Memory recent  impaired   Memory remote:  impaired   Concentration:  impaired   Abstraction:  abstract   Insight:  impaired due to psychosis    Reliability poor   Judgment:  impaired due to psychosis        Updated Collateral:       Brief Summary:  Ric Martinez tele-psych NP    Patient is a 33 y.o.  male who presents for psychosis. Patient presented to the ED on 08/04/24 via police and is currently on a TDO.  Patient has a past psychiatric history of schizoaffective disorder, salvatore, aggressive outbursts, and substance use disorder.  Patient is agreeable to the interview. Patient is alert and oriented x 4 and polite. He is cooperative; however, his distractibility and hallucinations make it difficult for him to have appropriate conversation. Patient has tangential and disorganized thinking. Patient also has paranoid delusions. Patient denies depression, anxiety, suicidal ideation, and homicidal ideation. Patient denies hallucinations; however, is constantly responding to internal stimuli.     This writer me with pt today he was in a one handed restraints and a cup in one hand. Pt cooperative remains a poor historian. Pt responding to internal stimuli at one point turns his head completely to the right having a conversation. Pt endorses he is having bad dreams and witnessed something. Pt continues to have

## 2024-08-06 NOTE — ED NOTES
Pt laying on stretcher,   with blanket over head.  Pt moved when I called his name,  did not pull blanket down.

## 2024-08-06 NOTE — ED NOTES
6:56 AM :Pt care assumed from Dr. Madden , ED provider. Pt complaint(s), current treatment plan, progression and available diagnostic results have been discussed thoroughly. The patient was seen and evaluated on my shift.   Rounding occurred: Yes  Intended Disposition: Placement  Pending diagnostic reports and/or labs (please list): Placement.    Patient is under TDO.  His mother stated that he was acting acutely psychotic.  Patient has a history of schizoaffective disorder, and cannabis use.  Patient has been medically cleared and awaiting placement by behavioral health team.      12:00PM -patient resting comfortably on stretcher no acute distress      6:11 PM : Pt care transferred to Dr. Madden  ,ED provider. History of patient complaint(s), available diagnostic reports and current treatment plan has been discussed thoroughly.   Bedside rounding on patient occured : Yes .  Intended disposition of patient : Placement  Pending diagnostics reports and/or labs (please list): Gerardo      Diagnosis:   1. Psychosis, unspecified psychosis type (HCC)          Disposition:    DISPOSITION      LWBS after RN Triage     [unfilled]        Sergei Laura DO  08/06/24 7356

## 2024-08-06 NOTE — ED NOTES
Pt laying with blanket over head.  Respirations even and unlabored.   [place] police in attendemce

## 2024-08-06 NOTE — ED NOTES
Bedside shift change report given to Joe (oncoming nurse) by SHANTEL (offgoing nurse). Report included the following information Nurse Handoff Report.

## 2024-08-07 LAB
APPEARANCE UR: CLEAR
BILIRUB UR QL: NEGATIVE
COLOR UR: YELLOW
ETHANOL SERPL-MCNC: <3 MG/DL (ref 0–3)
FLUAV RNA SPEC QL NAA+PROBE: NOT DETECTED
FLUBV RNA SPEC QL NAA+PROBE: NOT DETECTED
GLUCOSE UR STRIP.AUTO-MCNC: NEGATIVE MG/DL
HGB UR QL STRIP: NEGATIVE
KETONES UR QL STRIP.AUTO: 15 MG/DL
LEUKOCYTE ESTERASE UR QL STRIP.AUTO: NEGATIVE
NITRITE UR QL STRIP.AUTO: NEGATIVE
PH UR STRIP: 7 (ref 5–8)
PROT UR STRIP-MCNC: NEGATIVE MG/DL
SARS-COV-2 RNA RESP QL NAA+PROBE: NOT DETECTED
SP GR UR REFRACTOMETRY: 1.03 (ref 1–1.03)
UROBILINOGEN UR QL STRIP.AUTO: 1 EU/DL (ref 0.2–1)

## 2024-08-07 PROCEDURE — 6370000000 HC RX 637 (ALT 250 FOR IP): Performed by: EMERGENCY MEDICINE

## 2024-08-07 PROCEDURE — 87636 SARSCOV2 & INF A&B AMP PRB: CPT

## 2024-08-07 PROCEDURE — 82077 ASSAY SPEC XCP UR&BREATH IA: CPT

## 2024-08-07 PROCEDURE — 81003 URINALYSIS AUTO W/O SCOPE: CPT

## 2024-08-07 PROCEDURE — 94761 N-INVAS EAR/PLS OXIMETRY MLT: CPT

## 2024-08-07 RX ORDER — HALOPERIDOL 5 MG/ML
5 INJECTION INTRAMUSCULAR
Status: DISPENSED | OUTPATIENT
Start: 2024-08-07 | End: 2024-08-08

## 2024-08-07 RX ORDER — HALOPERIDOL 5 MG/1
5 TABLET ORAL EVERY 6 HOURS PRN
Status: DISCONTINUED | OUTPATIENT
Start: 2024-08-07 | End: 2024-08-09 | Stop reason: HOSPADM

## 2024-08-07 RX ORDER — DIPHENHYDRAMINE HYDROCHLORIDE 50 MG/ML
25 INJECTION INTRAMUSCULAR; INTRAVENOUS ONCE
Status: DISCONTINUED | OUTPATIENT
Start: 2024-08-07 | End: 2024-08-09 | Stop reason: HOSPADM

## 2024-08-07 RX ORDER — DIPHENHYDRAMINE HCL 25 MG
50 CAPSULE ORAL EVERY 6 HOURS PRN
Status: DISCONTINUED | OUTPATIENT
Start: 2024-08-07 | End: 2024-08-09 | Stop reason: HOSPADM

## 2024-08-07 RX ORDER — LORAZEPAM 1 MG/1
2 TABLET ORAL EVERY 6 HOURS PRN
Status: DISCONTINUED | OUTPATIENT
Start: 2024-08-07 | End: 2024-08-09 | Stop reason: HOSPADM

## 2024-08-07 RX ADMIN — LORAZEPAM 2 MG: 1 TABLET ORAL at 22:47

## 2024-08-07 NOTE — BSMART NOTE
Crisis Note: Contacted [place] [facility] and spoke with Crystal to get an update on the bed search. She said she sent a packet to [facility] to review for possible admission. Patient had court this morning and is now involuntarily committed.

## 2024-08-07 NOTE — ED NOTES
Bedside shift change report given to ELIAN Dueñas (oncoming nurse) by ELIAN Chance (offgoing nurse). Report included the following information Nurse Handoff Report.

## 2024-08-07 NOTE — ED NOTES
Bedside shift change report given to ELIAN Harris (oncoming nurse) by ELIAN Dueñas (offgoing nurse). Report included the following information Nurse Handoff Report.

## 2024-08-07 NOTE — ED NOTES
Assumed care of pt.    Pt resting in position of comfort on ED stretcher in lowest position with wheels locked, NAD noted

## 2024-08-07 NOTE — BH NOTE
Court hearing held for pt 08/07/2024. Court ruled pt to be involuntarily committed to any accepting facility.     -Javon Berry,

## 2024-08-07 NOTE — ED NOTES
Went in to obtain vitals.  Pt alert, able to tell me his name,  where he is ([place]) and the year.  Pt able to talk to me with no distractions.  When I was in process of getting vitals.  Pt started looking to his right and responding to internal stimulus.

## 2024-08-08 VITALS
OXYGEN SATURATION: 96 % | WEIGHT: 150 LBS | SYSTOLIC BLOOD PRESSURE: 120 MMHG | TEMPERATURE: 98.4 F | BODY MASS INDEX: 20.32 KG/M2 | HEIGHT: 72 IN | HEART RATE: 65 BPM | RESPIRATION RATE: 16 BRPM | DIASTOLIC BLOOD PRESSURE: 74 MMHG

## 2024-08-08 PROCEDURE — 6370000000 HC RX 637 (ALT 250 FOR IP): Performed by: EMERGENCY MEDICINE

## 2024-08-08 PROCEDURE — 94761 N-INVAS EAR/PLS OXIMETRY MLT: CPT

## 2024-08-08 RX ADMIN — LORAZEPAM 2 MG: 1 TABLET ORAL at 21:24

## 2024-08-08 NOTE — ED NOTES
Bedside shift change report given to ELIAN Mejia (oncoming nurse) by ELIAN Dueñas (offgoing nurse). Report included the following information Nurse Handoff Report.

## 2024-08-08 NOTE — BSMART NOTE
Crisis Note: [facility] [phone], regarding disposition for patient and given updated labs as requested; possible admission at [facility]; will assist as needed.     08/08/24 @ 01:58 am  Matias informed this writer that bed search is still in process; awaiting response from [facility].

## 2024-08-08 NOTE — ED NOTES
Assumed care of patient from ELIAN Gonsalez.  Patient in bed resting quietly. Patient talking to himself and is cuffed to bed via left wrist.

## 2024-08-08 NOTE — ED PROVIDER NOTES
6:39 PM :Pt care assumed from Dr. Witt , ED provider. Pt complaint(s), current treatment plan, progression and available diagnostic results have been discussed thoroughly. The patient was seen and evaluated on my shift.   Rounding occurred: Yes  Intended Disposition: TBD  Pending diagnostic reports and/or labs (please list): TDO, awaiting placement plan        Brad Scott MD  08/09/24 0860    
7:24 PM : Pt care assumed from Dr. Madden  ,ED provider. History of patient complaint(s), available diagnostic reports and current treatment plan has been discussed thoroughly.   Bedside rounding on patient occured : Yes  Medically cleared Yes  Status: TDO  Intended disposition of patient : transfer for inpatient psychiatry.  Pending diagnostics reports and/or labs (please list): pending placement.    Recent Results (from the past 12 hour(s))   ETOH    Collection Time: 08/07/24  3:52 PM   Result Value Ref Range    Ethanol Lvl <3 0 - 3 MG/DL   Urinalysis    Collection Time: 08/07/24  3:52 PM   Result Value Ref Range    Color, UA YELLOW      Appearance CLEAR      Specific Gravity, UA 1.029 1.005 - 1.030      pH, Urine 7.0 5.0 - 8.0      Protein, UA Negative NEG mg/dL    Glucose, Ur Negative NEG mg/dL    Ketones, Urine 15 (A) NEG mg/dL    Bilirubin, Urine Negative NEG      Blood, Urine Negative NEG      Urobilinogen, Urine 1.0 0.2 - 1.0 EU/dL    Nitrite, Urine Negative NEG      Leukocyte Esterase, Urine Negative NEG     COVID-19 & Influenza Combo    Collection Time: 08/07/24  3:52 PM    Specimen: Nasopharyngeal   Result Value Ref Range    SARS-CoV-2, PCR Not detected NOTD      Rapid Influenza A By PCR Not detected NOTD      Rapid Influenza B By PCR Not detected NOTD             ED Course as of 08/07/24 1924   Sun Aug 04, 2024   1902 Patient is refusing blood draw at this time.  Will you medicate patient to obtain blood sample.  Patient is on a legal hold with police officers. [KS]   Mon Aug 05, 2024   1902 Dv to ks 32 yo male pmh of psychosis/ drug abuse cc- psychosis/ medically cleared/ plan- on tdo pending placement.  [KS]   Tue Aug 06, 2024   1828 Cc to ks 32 yo male pmh of psychosis/ drug abuse cc- psychosis/ medically cleared/ plan- on tdo pending placement.  [KS]      ED Course User Index  [KS] Jordy Madden MD Kitchen, Toro GUZMAN MD  08/07/24 1924    
ED continued care note    6:21 PM EDT  Signed out to me by Dr. Todd, patient with schizophrenia on TDO, has been placed and will be leaving at 1 AM no acute issues.    Reviewed at 6:21 PM EDT  Patient Vitals for the past 12 hrs:   Temp Pulse Resp BP SpO2   08/08/24 1015 98.4 °F (36.9 °C) 65 16 120/74 96 %       ED Course as of 08/08/24 1821   Sun Aug 04, 2024   1902 Patient is refusing blood draw at this time.  Will you medicate patient to obtain blood sample.  Patient is on a legal hold with police officers. [KS]   Mon Aug 05, 2024   1902 Dv to ks 34 yo male pmh of psychosis/ drug abuse cc- psychosis/ medically cleared/ plan- on tdo pending placement.  [KS]   Tue Aug 06, 2024   1828 Cc to ks 34 yo male pmh of psychosis/ drug abuse cc- psychosis/ medically cleared/ plan- on tdo pending placement.  [KS]   Thu Aug 08, 2024   0651 Schizo looking for TDO looking for placement.  [MI]      ED Course User Index  [KS] Jordy Madden MD  [MI] Laz Pratt MD       Clinical Impression:   1. Psychosis, unspecified psychosis type (HCC)         Bry Charles MD  08/08/24 1821    
redirectable.           Diagnostic Study Results     Labs -  Recent Results (from the past 12 hour(s))   CBC with Auto Differential    Collection Time: 08/04/24  9:14 PM   Result Value Ref Range    WBC 10.7 4.6 - 13.2 K/uL    RBC 5.44 4.35 - 5.65 M/uL    Hemoglobin 15.1 13.0 - 16.0 g/dL    Hematocrit 46.3 36.0 - 48.0 %    MCV 85.1 78.0 - 100.0 FL    MCH 27.8 24.0 - 34.0 PG    MCHC 32.6 31.0 - 37.0 g/dL    RDW 13.3 11.6 - 14.5 %    Platelets 208 135 - 420 K/uL    MPV 11.2 9.2 - 11.8 FL    Nucleated RBCs 0.0 0  WBC    nRBC 0.00 0.00 - 0.01 K/uL    Neutrophils % 57 40 - 73 %    Lymphocytes % 32 21 - 52 %    Monocytes % 9 3 - 10 %    Eosinophils % 2 0 - 5 %    Basophils % 1 0 - 2 %    Immature Granulocytes % 0 0.0 - 0.5 %    Neutrophils Absolute 6.2 1.8 - 8.0 K/UL    Lymphocytes Absolute 3.4 0.9 - 3.6 K/UL    Monocytes Absolute 0.9 0.05 - 1.2 K/UL    Eosinophils Absolute 0.2 0.0 - 0.4 K/UL    Basophils Absolute 0.1 0.0 - 0.1 K/UL    Immature Granulocytes Absolute 0.0 0.00 - 0.04 K/UL    Differential Type AUTOMATED     CMP    Collection Time: 08/04/24  9:14 PM   Result Value Ref Range    Sodium 139 136 - 145 mmol/L    Potassium 3.7 3.5 - 5.5 mmol/L    Chloride 107 100 - 111 mmol/L    CO2 25 21 - 32 mmol/L    Anion Gap 7 3.0 - 18 mmol/L    Glucose 85 74 - 99 mg/dL    BUN 13 7.0 - 18 MG/DL    Creatinine 1.03 0.6 - 1.3 MG/DL    BUN/Creatinine Ratio 13 12 - 20      Est, Glom Filt Rate >90 >60 ml/min/1.73m2    Calcium 8.8 8.5 - 10.1 MG/DL    Total Bilirubin 0.3 0.2 - 1.0 MG/DL    ALT 21 16 - 61 U/L    AST 14 10 - 38 U/L    Alk Phosphatase 55 45 - 117 U/L    Total Protein 6.7 6.4 - 8.2 g/dL    Albumin 3.5 3.4 - 5.0 g/dL    Globulin 3.2 2.0 - 4.0 g/dL    Albumin/Globulin Ratio 1.1 0.8 - 1.7     ETOH    Collection Time: 08/04/24  9:14 PM   Result Value Ref Range    Ethanol Lvl <3 0 - 3 MG/DL       Radiologic Studies -   No orders to display         Medical Decision Making   I am the first provider for this patient.    I

## 2024-08-08 NOTE — BSMART NOTE
Crisis note;    Per Crystal of [facility] patient has been accepted to [facility]. Transport set for 1 AM.

## 2024-08-08 NOTE — VIRTUAL HEALTH
Telepsych  Crisis 24-Hour Reassessment       Pt unable to meaningfully participate in reassessment.    Brief Summary: Per record, \"Patient responding to internal stimuli at this time. Patient is in forensic restraints.\"     Updated Level of Care/Disposition:  Per record, \"Court hearing held for pt 08/07/2024. Court ruled pt to be involuntarily committed to any accepting facility.\"    Please consult for any additional assistance from Telepsych.       Total time spent on this encounter: Not billed by time    --Kerri Rangel LCSW on 8/7/2024 at 9:05 PM    An electronic signature was used to authenticate this note.

## 2024-11-07 ENCOUNTER — HOSPITAL ENCOUNTER (EMERGENCY)
Facility: HOSPITAL | Age: 34
Discharge: PSYCHIATRIC OR SUBSTANCE USE FACILITY | End: 2024-11-08
Attending: EMERGENCY MEDICINE
Payer: MEDICAID

## 2024-11-07 DIAGNOSIS — F23 ACUTE PSYCHOSIS (HCC): Primary | ICD-10-CM

## 2024-11-07 PROCEDURE — 99285 EMERGENCY DEPT VISIT HI MDM: CPT

## 2024-11-08 VITALS
DIASTOLIC BLOOD PRESSURE: 72 MMHG | RESPIRATION RATE: 16 BRPM | SYSTOLIC BLOOD PRESSURE: 128 MMHG | OXYGEN SATURATION: 100 % | HEART RATE: 68 BPM | TEMPERATURE: 98.3 F

## 2024-11-08 LAB
ALBUMIN SERPL-MCNC: 3.6 G/DL (ref 3.4–5)
ALBUMIN/GLOB SERPL: 1.3 (ref 0.8–1.7)
ALP SERPL-CCNC: 64 U/L (ref 45–117)
ALT SERPL-CCNC: 29 U/L (ref 16–61)
AMPHET UR QL SCN: NEGATIVE
ANION GAP SERPL CALC-SCNC: 4 MMOL/L (ref 3–18)
AST SERPL-CCNC: 14 U/L (ref 10–38)
BARBITURATES UR QL SCN: NEGATIVE
BASOPHILS # BLD: 0 K/UL (ref 0–0.1)
BASOPHILS NFR BLD: 0 % (ref 0–2)
BENZODIAZ UR QL: NEGATIVE
BILIRUB DIRECT SERPL-MCNC: 0.1 MG/DL (ref 0–0.2)
BILIRUB SERPL-MCNC: 0.3 MG/DL (ref 0.2–1)
BUN SERPL-MCNC: 11 MG/DL (ref 7–18)
BUN/CREAT SERPL: 10 (ref 12–20)
CALCIUM SERPL-MCNC: 9.2 MG/DL (ref 8.5–10.1)
CANNABINOIDS UR QL SCN: POSITIVE
CHLORIDE SERPL-SCNC: 109 MMOL/L (ref 100–111)
CO2 SERPL-SCNC: 28 MMOL/L (ref 21–32)
COCAINE UR QL SCN: NEGATIVE
CREAT SERPL-MCNC: 1.1 MG/DL (ref 0.6–1.3)
DIFFERENTIAL METHOD BLD: ABNORMAL
EOSINOPHIL # BLD: 0 K/UL (ref 0–0.4)
EOSINOPHIL NFR BLD: 0 % (ref 0–5)
ERYTHROCYTE [DISTWIDTH] IN BLOOD BY AUTOMATED COUNT: 13.9 % (ref 11.6–14.5)
ETHANOL SERPL-MCNC: <3 MG/DL (ref 0–3)
GLOBULIN SER CALC-MCNC: 2.8 G/DL (ref 2–4)
GLUCOSE SERPL-MCNC: 94 MG/DL (ref 74–99)
HCT VFR BLD AUTO: 45.1 % (ref 36–48)
HGB BLD-MCNC: 14.6 G/DL (ref 13–16)
IMM GRANULOCYTES # BLD AUTO: 0 K/UL (ref 0–0.04)
IMM GRANULOCYTES NFR BLD AUTO: 0 % (ref 0–0.5)
LYMPHOCYTES # BLD: 3.9 K/UL (ref 0.9–3.6)
LYMPHOCYTES NFR BLD: 33 % (ref 21–52)
Lab: ABNORMAL
MCH RBC QN AUTO: 26.9 PG (ref 24–34)
MCHC RBC AUTO-ENTMCNC: 32.4 G/DL (ref 31–37)
MCV RBC AUTO: 83.2 FL (ref 78–100)
METHADONE UR QL: NEGATIVE
MONOCYTES # BLD: 1.1 K/UL (ref 0.05–1.2)
MONOCYTES NFR BLD: 9 % (ref 3–10)
NEUTS SEG # BLD: 6.9 K/UL (ref 1.8–8)
NEUTS SEG NFR BLD: 58 % (ref 40–73)
NRBC # BLD: 0 K/UL (ref 0–0.01)
NRBC BLD-RTO: 0 PER 100 WBC
OPIATES UR QL: NEGATIVE
PCP UR QL: NEGATIVE
PLATELET # BLD AUTO: 261 K/UL (ref 135–420)
PLATELET COMMENT: ABNORMAL
PMV BLD AUTO: 10.8 FL (ref 9.2–11.8)
POTASSIUM SERPL-SCNC: 3.9 MMOL/L (ref 3.5–5.5)
PROT SERPL-MCNC: 6.4 G/DL (ref 6.4–8.2)
RBC # BLD AUTO: 5.42 M/UL (ref 4.35–5.65)
RBC MORPH BLD: ABNORMAL
SODIUM SERPL-SCNC: 141 MMOL/L (ref 136–145)
VALPROATE SERPL-MCNC: <3 UG/ML (ref 50–100)
WBC # BLD AUTO: 11.9 K/UL (ref 4.6–13.2)

## 2024-11-08 PROCEDURE — 80048 BASIC METABOLIC PNL TOTAL CA: CPT

## 2024-11-08 PROCEDURE — 6370000000 HC RX 637 (ALT 250 FOR IP): Performed by: EMERGENCY MEDICINE

## 2024-11-08 PROCEDURE — 2580000003 HC RX 258: Performed by: EMERGENCY MEDICINE

## 2024-11-08 PROCEDURE — 94761 N-INVAS EAR/PLS OXIMETRY MLT: CPT

## 2024-11-08 PROCEDURE — 6360000002 HC RX W HCPCS: Performed by: EMERGENCY MEDICINE

## 2024-11-08 PROCEDURE — 80164 ASSAY DIPROPYLACETIC ACD TOT: CPT

## 2024-11-08 PROCEDURE — 96372 THER/PROPH/DIAG INJ SC/IM: CPT

## 2024-11-08 PROCEDURE — 80076 HEPATIC FUNCTION PANEL: CPT

## 2024-11-08 PROCEDURE — 82077 ASSAY SPEC XCP UR&BREATH IA: CPT

## 2024-11-08 PROCEDURE — 85025 COMPLETE CBC W/AUTO DIFF WBC: CPT

## 2024-11-08 PROCEDURE — 80307 DRUG TEST PRSMV CHEM ANLYZR: CPT

## 2024-11-08 RX ORDER — LORAZEPAM 1 MG/1
2 TABLET ORAL ONCE
Status: COMPLETED | OUTPATIENT
Start: 2024-11-08 | End: 2024-11-08

## 2024-11-08 RX ADMIN — ZIPRASIDONE MESYLATE 20 MG: 20 INJECTION, POWDER, LYOPHILIZED, FOR SOLUTION INTRAMUSCULAR at 00:52

## 2024-11-08 RX ADMIN — LORAZEPAM 2 MG: 1 TABLET ORAL at 02:58

## 2024-11-08 ASSESSMENT — PAIN - FUNCTIONAL ASSESSMENT: PAIN_FUNCTIONAL_ASSESSMENT: NONE - DENIES PAIN

## 2024-11-08 NOTE — ED NOTES
Pt report received from Theodore PLUMMER, Pt asleep in bed at this time. [place]  at bedside. Per report pt has been aggressive and violent. Pt has equal chest rise and fall, appropriate skin color, no distress noted.

## 2024-11-08 NOTE — ED NOTES
Pt awake and talking. RN advises we will take his vitals and update him on the current status. Pt agrees.

## 2024-11-08 NOTE — BSMART NOTE
Crisis Note: Writer spoke with Crystal with [facility], [phone], regarding transportation. Fiorella reports she will follow-up with [place] [employer]. Crisis will assist as needed.
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
## 2024-11-08 NOTE — ED PROVIDER NOTES
EMERGENCY DEPARTMENT HISTORY AND PHYSICAL EXAM      Patient Name: Teddy Moore  MRN: [id number]  YOB: 1990  Provider: Mulu Sosa MD  PCP: Zak Casas MD   Time/Date of evaluation: 10:54 PM EST on 11/7/24    History of Presenting Illness     Chief Complaint   Patient presents with    Mental Health Problem       History Provided By: Patient and Law Enforcement     History (Narative):   Teddy Moore is a 34 y.o. male with a PMHX of schizoaffective disorder and substance abuse  who presents to the emergency department   via law enforcement  C/O under an ECO.  The patient was reportedly running around in the street.  He was very aggressive with law enforcement.  He is yelling obscenities out the door towards the law enforcement officers.         Past History     Past Medical History:  Past Medical History:   Diagnosis Date    Aggressive outburst     Nina (HCC)     Schizophrenia (HCC)     Substance abuse (HCC)        Past Surgical History:  History reviewed. No pertinent surgical history.    Family History:  Family History   Problem Relation Age of Onset    Bipolar Disorder Maternal Aunt        Social History:  Social History     Tobacco Use    Smoking status: Every Day     Current packs/day: 1.00     Types: Cigarettes    Smokeless tobacco: Never   Substance Use Topics    Alcohol use: No    Drug use: No       Medications:  Current Facility-Administered Medications   Medication Dose Route Frequency Provider Last Rate Last Admin    ziprasidone (GEODON) 20 mg in sterile water 1 mL injection  20 mg IntraMUSCular Once Mulu Sosa MD         Current Outpatient Medications   Medication Sig Dispense Refill    divalproex (DEPAKOTE) 250 MG DR tablet Take 3 tablets by mouth 2 times daily For a total of 750 mg twice daily. 180 tablet 0    traZODone (DESYREL) 150 MG tablet Take 1 tablet by mouth nightly as needed for Sleep 30 tablet 0    OLANZapine zydis (ZYPREXA) 10 MG disintegrating tablet  disintegrating tablet Take 1 tablet by mouth daily 30 tablet 0   • OLANZapine zydis (ZYPREXA) 15 MG disintegrating tablet Take 1 tablet by mouth nightly 30 tablet 0   • benztropine (COGENTIN) 1 MG tablet Take 1 tablet by mouth daily 30 tablet 0       Allergies:  Allergies   Allergen Reactions   • Lithium Other (See Comments)     Pt has no history of this medication on file of being dispensed.       Social Determinants of Health:  Social Determinants of Health     Tobacco Use: High Risk (11/7/2024)    Patient History    • Smoking Tobacco Use: Every Day    • Smokeless Tobacco Use: Never    • Passive Exposure: Not on file   Alcohol Use: Not At Risk (8/4/2024)    AUDIT-C    • Frequency of Alcohol Consumption: Never    • Average Number of Drinks: Patient does not drink    • Frequency of Binge Drinking: Never   Financial Resource Strain: Low Risk  (2/29/2024)    Received from [facility]    Overall Financial Resource Strain (CARDIA)    • Difficulty of Paying Living Expenses: Not hard at all   Food Insecurity: No Food Insecurity (5/17/2024)    Hunger Vital Sign    • Worried About Running Out of Food in the Last Year: Never true    • Ran Out of Food in the Last Year: Never true   Transportation Needs: No Transportation Needs (5/17/2024)    PRAPARE - Transportation    • Lack of Transportation (Medical): No    • Lack of Transportation (Non-Medical): No   Physical Activity: Not on file   Stress: Not on file   Social Connections: Not on file   Intimate Partner Violence: Unknown (2/27/2024)    [facility]    Humiliation, Afraid, Rape, and Kick questionnaire    • Fear of Current or Ex-Partner: Not on file    • Emotionally Abused: Not on file    • Physically Abused: No    • Sexually Abused: Not on file   Depression: Not at risk (2/29/2024)    [facility]    PHQ-2    • Patient Health

## 2025-03-07 ENCOUNTER — HOSPITAL ENCOUNTER (EMERGENCY)
Facility: HOSPITAL | Age: 35
Discharge: ANOTHER HOSPITAL | End: 2025-03-08
Attending: STUDENT IN AN ORGANIZED HEALTH CARE EDUCATION/TRAINING PROGRAM
Payer: MEDICAID

## 2025-03-07 DIAGNOSIS — F29 PSYCHOSIS, UNSPECIFIED PSYCHOSIS TYPE (HCC): ICD-10-CM

## 2025-03-07 DIAGNOSIS — F20.9 SCHIZOPHRENIA, UNSPECIFIED TYPE (HCC): Primary | ICD-10-CM

## 2025-03-07 LAB
ALBUMIN SERPL-MCNC: 4.1 G/DL (ref 3.4–5)
ALBUMIN/GLOB SERPL: 1.3 (ref 0.8–1.7)
ALP SERPL-CCNC: 63 U/L (ref 45–117)
ALT SERPL-CCNC: 37 U/L (ref 16–61)
AMPHET UR QL SCN: NEGATIVE
ANION GAP SERPL CALC-SCNC: 6 MMOL/L (ref 3–18)
APAP SERPL-MCNC: <2 UG/ML (ref 10–30)
AST SERPL-CCNC: 24 U/L (ref 10–38)
BARBITURATES UR QL SCN: NEGATIVE
BASOPHILS # BLD: 0.04 K/UL (ref 0–0.1)
BASOPHILS NFR BLD: 0.3 % (ref 0–2)
BENZODIAZ UR QL: NEGATIVE
BILIRUB SERPL-MCNC: 0.4 MG/DL (ref 0.2–1)
BUN SERPL-MCNC: 6 MG/DL (ref 7–18)
BUN/CREAT SERPL: 6 (ref 12–20)
CALCIUM SERPL-MCNC: 9.2 MG/DL (ref 8.5–10.1)
CANNABINOIDS UR QL SCN: POSITIVE
CHLORIDE SERPL-SCNC: 105 MMOL/L (ref 100–111)
CO2 SERPL-SCNC: 27 MMOL/L (ref 21–32)
COCAINE UR QL SCN: NEGATIVE
CREAT SERPL-MCNC: 0.95 MG/DL (ref 0.6–1.3)
DIFFERENTIAL METHOD BLD: ABNORMAL
EOSINOPHIL # BLD: 0.05 K/UL (ref 0–0.4)
EOSINOPHIL NFR BLD: 0.4 % (ref 0–5)
ERYTHROCYTE [DISTWIDTH] IN BLOOD BY AUTOMATED COUNT: 14.4 % (ref 11.6–14.5)
ETHANOL SERPL-MCNC: <3 MG/DL (ref 0–3)
GLOBULIN SER CALC-MCNC: 3.2 G/DL (ref 2–4)
GLUCOSE SERPL-MCNC: 84 MG/DL (ref 74–99)
HCT VFR BLD AUTO: 42.3 % (ref 36–48)
HGB BLD-MCNC: 13.9 G/DL (ref 13–16)
IMM GRANULOCYTES # BLD AUTO: 0.01 K/UL (ref 0–0.04)
IMM GRANULOCYTES NFR BLD AUTO: 0.1 % (ref 0–0.5)
LYMPHOCYTES # BLD: 2.43 K/UL (ref 0.9–3.6)
LYMPHOCYTES NFR BLD: 20.6 % (ref 21–52)
Lab: ABNORMAL
MCH RBC QN AUTO: 27 PG (ref 24–34)
MCHC RBC AUTO-ENTMCNC: 32.9 G/DL (ref 31–37)
MCV RBC AUTO: 82.1 FL (ref 78–100)
METHADONE UR QL: NEGATIVE
MONOCYTES # BLD: 0.94 K/UL (ref 0.05–1.2)
MONOCYTES NFR BLD: 8 % (ref 3–10)
NEUTS SEG # BLD: 8.3 K/UL (ref 1.8–8)
NEUTS SEG NFR BLD: 70.6 % (ref 40–73)
NRBC # BLD: 0 K/UL (ref 0–0.01)
NRBC BLD-RTO: 0 PER 100 WBC
OPIATES UR QL: NEGATIVE
PCP UR QL: NEGATIVE
PLATELET # BLD AUTO: 246 K/UL (ref 135–420)
PMV BLD AUTO: 10.8 FL (ref 9.2–11.8)
POTASSIUM SERPL-SCNC: 3.8 MMOL/L (ref 3.5–5.5)
PROT SERPL-MCNC: 7.3 G/DL (ref 6.4–8.2)
RBC # BLD AUTO: 5.15 M/UL (ref 4.35–5.65)
SALICYLATES SERPL-MCNC: 2.4 MG/DL (ref 2.8–20)
SODIUM SERPL-SCNC: 138 MMOL/L (ref 136–145)
WBC # BLD AUTO: 11.8 K/UL (ref 4.6–13.2)

## 2025-03-07 PROCEDURE — 82077 ASSAY SPEC XCP UR&BREATH IA: CPT

## 2025-03-07 PROCEDURE — 80307 DRUG TEST PRSMV CHEM ANLYZR: CPT

## 2025-03-07 PROCEDURE — 90792 PSYCH DIAG EVAL W/MED SRVCS: CPT | Performed by: REGISTERED NURSE

## 2025-03-07 PROCEDURE — 85025 COMPLETE CBC W/AUTO DIFF WBC: CPT

## 2025-03-07 PROCEDURE — 80053 COMPREHEN METABOLIC PANEL: CPT

## 2025-03-07 PROCEDURE — 99285 EMERGENCY DEPT VISIT HI MDM: CPT

## 2025-03-07 PROCEDURE — 80143 DRUG ASSAY ACETAMINOPHEN: CPT

## 2025-03-07 PROCEDURE — 80179 DRUG ASSAY SALICYLATE: CPT
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
## 2025-03-07 NOTE — BSMART NOTE
Crisis note:    Assisted with patient changing into blue paper scrubs. Patient actively hallucinating responding to internal stimuli. Illogical at times. Initially was reluctant to change but did so with direction and patience from this writer. Also officer present during this process and provided assistance. Patient took off all his clothes except his underwear and socks. Officer reports patient was patted down by him upon taking custody of him on the ECO issued. Patient allowed vital signs to be down and was cooperative with this. Patient is stating about wanting to go to [place] and becoming an intern. He also stated he is not from this country. Patient asked for juice. Patient given meal tray and 2 orange juices. Also given a large cut of ice water. Attempted to get urine specimen however no cup was in the room and by the time a urine specimen cup was obtain patient had already voiced in the toilet.    Yari of [facility] has completed the TDO eval by use of the crisis phone, face time used by Yari to complete the evaluation. Yari asked if there are bed available here and was told yes, but informed that conduit will have to present case to the on call provider.

## 2025-03-07 NOTE — FLOWSHEET NOTE
03/07/25 1730   Vital Signs   Temp 97.6 °F (36.4 °C)   Temp Source Oral   Pulse 60   Respirations 18   /79   MAP (Calculated) 93   Pain Assessment   Pain Assessment None - Denies Pain   Oxygen Therapy   SpO2 98 %

## 2025-03-07 NOTE — ED NOTES
Patient refused to get dressed out and allow me to obtain vital signs  Told me he would inflict harm if tried to do so.
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
## 2025-03-08 VITALS
OXYGEN SATURATION: 93 % | SYSTOLIC BLOOD PRESSURE: 169 MMHG | TEMPERATURE: 97.7 F | DIASTOLIC BLOOD PRESSURE: 69 MMHG | RESPIRATION RATE: 22 BRPM | HEART RATE: 98 BPM

## 2025-03-08 NOTE — BSMART NOTE
CRISIS NOTE:  Received call from Tanesha,  Patient is accepted to [facility].  Advised [facility] should be notified; but would relay the message to have [facility] call her back. She can be reached at [phone]. 0059:  Spoke to DASH Montoya ES; advised Patient has been accepted and to call Tanesha at [phone].    0126:Received call from ED Charge Nurse, Chacha who confirmed Patient is going to [facility].          Dalia Ivory, JIM, CSAC, CADC  [employer] Counselor

## 2025-03-08 NOTE — ED NOTES
Gave report to Tanesha Mathew RN in [facility] [place]. All questions were answered.

## 2025-03-08 NOTE — BSMART NOTE
CRISIS NOTE:  Spoke with Jolene Rowley who advised Pt has been denied admission at [facility].  However, she left a message with the [employer], but no one has returned her call.     0011: Called [facility] answering service and left message to have the on call worker [facility].    0020: [facility] to advise Pt has been denied admission to [facility].        Dalia Ivory, LPC, CSAC, CADC  [employer] Counselor

## 2025-03-08 NOTE — ED PROVIDER NOTES
Patient has no acute issues he was accepted by [place] transported by      Laz Pratt MD  03/08/25 9948

## 2025-03-08 NOTE — BSMART NOTE
Crisis note:    Called the ER and spoke to patient's nurse to remind her that a transfer check list needs completed. She states she will complete. Also nurse informed urine specimen obtained and sent to the lab. Asked her to remind the attending physician to complete his note and document patient is medically clear.
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
